# Patient Record
Sex: MALE | Race: WHITE | NOT HISPANIC OR LATINO | Employment: OTHER | URBAN - METROPOLITAN AREA
[De-identification: names, ages, dates, MRNs, and addresses within clinical notes are randomized per-mention and may not be internally consistent; named-entity substitution may affect disease eponyms.]

---

## 2019-08-14 PROBLEM — I25.10 CORONARY ARTERIOSCLEROSIS: Status: ACTIVE | Noted: 2019-08-14

## 2019-08-14 PROBLEM — Z92.29 H/O HIGH RISK MEDICATION TREATMENT: Status: ACTIVE | Noted: 2019-08-14

## 2019-08-14 PROBLEM — E83.42 HYPOMAGNESEMIA: Status: ACTIVE | Noted: 2019-08-14

## 2019-08-14 PROBLEM — E03.9 HYPOTHYROIDISM: Status: ACTIVE | Noted: 2019-08-14

## 2019-08-14 PROBLEM — Z92.89 H/O CARDIOVASCULAR STRESS TEST: Status: ACTIVE | Noted: 2019-08-14

## 2019-08-14 PROBLEM — Z98.890 H/O CARDIAC CATHETERIZATION: Status: ACTIVE | Noted: 2019-08-14

## 2019-08-14 PROBLEM — K76.0 STEATOSIS OF LIVER: Status: ACTIVE | Noted: 2019-08-14

## 2019-08-14 PROBLEM — E11.9 DIABETES MELLITUS (HCC): Status: ACTIVE | Noted: 2019-08-14

## 2019-08-14 PROBLEM — Z95.1 S/P CABG (CORONARY ARTERY BYPASS GRAFT): Status: ACTIVE | Noted: 2019-08-14

## 2019-08-14 PROBLEM — Z92.89 H/O ECHOCARDIOGRAM: Status: ACTIVE | Noted: 2019-08-14

## 2019-08-14 PROBLEM — I10 BENIGN ESSENTIAL HYPERTENSION: Status: ACTIVE | Noted: 2019-08-14

## 2019-08-15 ENCOUNTER — OFFICE VISIT (OUTPATIENT)
Dept: CARDIOLOGY | Facility: CLINIC | Age: 64
End: 2019-08-15

## 2019-08-15 VITALS
WEIGHT: 237 LBS | SYSTOLIC BLOOD PRESSURE: 164 MMHG | OXYGEN SATURATION: 98 % | DIASTOLIC BLOOD PRESSURE: 72 MMHG | HEART RATE: 81 BPM | BODY MASS INDEX: 35.1 KG/M2 | HEIGHT: 69 IN

## 2019-08-15 DIAGNOSIS — I25.10 CORONARY ARTERIOSCLEROSIS: ICD-10-CM

## 2019-08-15 DIAGNOSIS — E66.09 CLASS 2 OBESITY DUE TO EXCESS CALORIES WITHOUT SERIOUS COMORBIDITY WITH BODY MASS INDEX (BMI) OF 35.0 TO 35.9 IN ADULT: ICD-10-CM

## 2019-08-15 DIAGNOSIS — E78.5 DYSLIPIDEMIA: ICD-10-CM

## 2019-08-15 DIAGNOSIS — Z95.1 S/P CABG (CORONARY ARTERY BYPASS GRAFT): ICD-10-CM

## 2019-08-15 DIAGNOSIS — I10 BENIGN ESSENTIAL HYPERTENSION: Primary | ICD-10-CM

## 2019-08-15 DIAGNOSIS — I10 HYPERTENSION WITH HYPERVOLEMIA: ICD-10-CM

## 2019-08-15 DIAGNOSIS — E10.9 TYPE 1 DIABETES MELLITUS WITHOUT COMPLICATION (HCC): ICD-10-CM

## 2019-08-15 DIAGNOSIS — G47.33 OBSTRUCTIVE SLEEP APNEA ON CPAP: ICD-10-CM

## 2019-08-15 DIAGNOSIS — Z99.89 OBSTRUCTIVE SLEEP APNEA ON CPAP: ICD-10-CM

## 2019-08-15 DIAGNOSIS — E87.79 HYPERTENSION WITH HYPERVOLEMIA: ICD-10-CM

## 2019-08-15 DIAGNOSIS — Z94.0 KIDNEY TRANSPLANTED: ICD-10-CM

## 2019-08-15 PROCEDURE — 99214 OFFICE O/P EST MOD 30 MIN: CPT | Performed by: INTERNAL MEDICINE

## 2019-08-15 RX ORDER — UBIDECARENONE 200 MG
200 CAPSULE ORAL DAILY
COMMUNITY

## 2019-08-15 RX ORDER — FLUOROURACIL 5 MG/G
1 CREAM TOPICAL DAILY PRN
COMMUNITY
End: 2022-10-06

## 2019-08-15 RX ORDER — AMOXICILLIN 500 MG
1200 CAPSULE ORAL DAILY
COMMUNITY
End: 2022-03-31 | Stop reason: ALTCHOICE

## 2019-08-15 NOTE — PROGRESS NOTES
[facility]        Subjective:     Encounter Date:08/15/2019      Patient ID: Ken Omalley is a 63 y.o. male.      HPI: I saw Ken Omalley today for cardiovascular care.  He is a pleasant 63-year-old male who is active.  He does not experience chest pain pressure tightness.  He has his baseline dyspnea on exertion but this is without change.  He is free of orthopnea or PND he denies syncope near syncope or any significant palpitations.  He continues to have lower extremity edema.  He admits to not taking his diuretic therapy every day.  His weight has ranged from 231-235 and his blood pressure has ranged between 159//73.  His blood pressure is elevated here today at 164/72.    Mr. Omalley underwent coronary artery bypass surgery in 2004.  He is undergone previous kidney transplantation.  He obtain an echocardiogram 1/30/2018 demonstrated left ventricular ejection fraction 35 to 40%, mild mitral and tricuspid insufficiency was noted.  He has been found to have obstructive sleep apnea and is using CPAP on a routine basis.      The following portions of the patient's history were reviewed and updated as appropriate: allergies, current medications, past family history, past medical history, past social history, past surgical history and problem list.    Problem List:  Patient Active Problem List   Diagnosis   • Surgery follow-up examination   • Dyslipidemia   • Kidney transplanted   • Hypothyroidism   • Diabetes mellitus (CMS/HCC)   • Hypomagnesemia   • Benign essential hypertension   • Coronary arteriosclerosis   • Steatosis of liver   • H/O high risk medication treatment   • H/O echocardiogram   • H/O cardiovascular stress test   • H/O cardiac catheterization   • S/P CABG (coronary artery bypass graft)   • Hypertension with hypervolemia   • Obstructive sleep apnea on CPAP   • Class 2 obesity due to excess calories without serious comorbidity with body mass index (BMI) of 35.0 to 35.9  in adult       Past Medical History:  Past Medical History:   Diagnosis Date   • Benign essential hypertension 8/14/2019   • Chronic kidney disease    • Coronary arteriosclerosis 8/14/2019    H/o CABG   • Depression    • Diabetes mellitus (CMS/HCC)    • Disease of thyroid gland    • GERD (gastroesophageal reflux disease)    • H/O cardiac catheterization 8/14/2019 11/28/2010 - Left main 50%.  LAD mid 100%.  LCX 50%, proximal 50%, small ramus branch mid 60%.  OM1 proximal 40%.  Zachary circumflex prior to PDA 60-70%.  RCA nondominant, mid 80%.  DA to LAD patent.  SVG to brittany small diagonal branch was patent, sequential SVT to ramus and mid marginal totally occluded.   • H/O echocardiogram 8/14/2019 03/27/2014 - EF 55-60%.  Mild concentric LVH.  Moderately dilated LA.  Mild MI and TI.  Trace AI.   • H/O high risk medication treatment 8/14/2019   • Heart attack (CMS/HCC)     2010   • Hypertension    • Hypomagnesemia 8/14/2019   • Hypothyroidism 8/14/2019   • Kidney failure    • Kidney transplant failure     2009   • Low back pain    • S/P CABG (coronary artery bypass graft) 8/14/2019 01/01/2004 - LIMA to the LAD, SVG to the diagonal branch, and sequential SVG to the intermediui artery and obtuse marginal branch of the circumflex.   • Sciatic pain     RIGHT    • Steatosis of liver 8/14/2019   • Wears hearing aid        Past Surgical History:  Past Surgical History:   Procedure Laterality Date   • CHOLECYSTECTOMY     • CORONARY ARTERY BYPASS GRAFT     • DIALYSIS FISTULA CREATION Left    • EPIDURAL BLOCK     • LUMBAR DISCECTOMY FUSION INSTRUMENTATION Right 10/17/2016    Procedure: Right L4 5 laminectomy and discectomy with Metrix;  Surgeon: Segrio Strange MD;  Location: [facility];  Service:    • TRANSPLANTATION RENAL      2006 2011   • VASECTOMY         Social History:  Social History     Socioeconomic History   • Marital status:      Spouse name: Not on file   • Number of children: Not on file   •  "Years of education: Not on file   • Highest education level: Not on file   Tobacco Use   • Smoking status: Never Smoker   • Smokeless tobacco: Never Used   Substance and Sexual Activity   • Alcohol use: No   • Drug use: No   • Sexual activity: Defer       Allergies:  Allergies   Allergen Reactions   • Contrast Dye      KIDNEY TRANSPLANT         ROS:  Review of Systems   Constitution: Negative for weakness and malaise/fatigue.   HENT: Negative for hearing loss and nosebleeds.    Eyes: Negative for double vision and visual disturbance.   Cardiovascular: Positive for dyspnea on exertion and leg swelling. Negative for chest pain, claudication, near-syncope, orthopnea, palpitations, paroxysmal nocturnal dyspnea and syncope.   Respiratory: Negative for cough, shortness of breath, sleep disturbances due to breathing, snoring, sputum production and wheezing.    Endocrine: Negative for cold intolerance, heat intolerance, polydipsia and polyuria.   Hematologic/Lymphatic: Negative for adenopathy and bleeding problem. Does not bruise/bleed easily.   Skin: Negative for flushing and itching.   Musculoskeletal: Negative for back pain, falls, joint pain, joint swelling, muscle weakness and neck pain.   Gastrointestinal: Negative for abdominal pain, dysphagia, heartburn, nausea and vomiting.   Genitourinary: Negative for dysuria and frequency.   Neurological: Negative for disturbances in coordination, dizziness, light-headedness, loss of balance and numbness.   Psychiatric/Behavioral: Negative for altered mental status and memory loss. The patient is not nervous/anxious.    Allergic/Immunologic: Negative for hives and persistent infections.          Objective:         /72 (BP Location: Right arm, Patient Position: Sitting, Cuff Size: Large Adult)   Pulse 81   Ht 175.3 cm (69\")   Wt 108 kg (237 lb)   SpO2 98%   BMI 35.00 kg/m²     Physical Exam   Constitutional: He is oriented to person, place, and time. He appears " well-developed and well-nourished.   HENT:   Head: Normocephalic and atraumatic.   Eyes: Conjunctivae and EOM are normal. Pupils are equal, round, and reactive to light.   Neck: Neck supple. No thyromegaly present.   Cardiovascular: Normal rate, regular rhythm, S1 normal, S2 normal and intact distal pulses. PMI is not displaced. Exam reveals gallop and S4. Exam reveals no S3, no distant heart sounds, no friction rub, no midsystolic click and no opening snap.   No murmur heard.  Pulses:       Carotid pulses are 2+ on the right side, and 2+ on the left side.       Radial pulses are 2+ on the right side, and 2+ on the left side.        Femoral pulses are 2+ on the right side, and 2+ on the left side.       Dorsalis pedis pulses are 2+ on the right side, and 2+ on the left side.        Posterior tibial pulses are 2+ on the right side, and 2+ on the left side.   1/6 systolic murmur left sternal border   Pulmonary/Chest: Effort normal and breath sounds normal. No respiratory distress. He has no wheezes. He has no rales.   Abdominal: Soft. Bowel sounds are normal. He exhibits no distension and no mass. There is no tenderness.   Musculoskeletal: Normal range of motion. He exhibits no edema.   1+ bilateral lower extremity edema   Neurological: He is alert and oriented to person, place, and time.   Skin: Skin is warm and dry. No erythema.   Psychiatric: He has a normal mood and affect.       In-Office Procedure(s):  Procedures    ASCVD RIsk Score::  The 10-year ASCVD risk score ([place] TITUS Jr., et al., 2013) is: 50.6%    Values used to calculate the score:      Age: 63 years      Sex: Male      Is Non- : No      Diabetic: Yes      Tobacco smoker: No      Systolic Blood Pressure: 164 mmHg      Is BP treated: Yes      HDL Cholesterol: 32 mg/dL      Total Cholesterol: 308 mg/dL        Assessment/Plan:         1. Benign essential hypertension  Salt restriction to his close to 2000 mg a day as possible, trial  of Bystolic 5 mg a day, target blood pressure less than 130/80    2. Hypertension with hypervolemia  Salt and fluid restriction, encouraged to take furosemide 40 mg daily    3. Coronary arteriosclerosis  Stable    4. S/P CABG (coronary artery bypass graft)  Stable    5. Type 1 diabetes mellitus without complication (CMS/HCC)  Stable    6. Dyslipidemia  Observe low-cholesterol low saturated weight reduction diet, continue pitavastatin 1 mg daily    7. Class 2 obesity due to excess calories without serious comorbidity with body mass index (BMI) of 35.0 to 35.9 in adult  Weight reduction    8. Obstructive sleep apnea on CPAP  Continue CPAP    9. Kidney transplanted      I had a greater than 30-minute face-to-face discussion with Mr. Omalley, greater than 50% was spent in counseling.  We discussed the importance of blood pressure control.  We discussed restricting salt and fluid intake increasing activity and observing a low-cholesterol low saturated weight reduction diet.  I have asked him to monitor his blood pressure at home with the addition of B 5 mg daily.ystolic we will discuss with his nephrologist today of possible addition of an ACE inhibitor or ARB if needed.  I will plan to see him in follow-up in 4 to 6 weeks.           Edmundo Conner MD  08/15/19  .

## 2019-09-26 ENCOUNTER — OFFICE VISIT (OUTPATIENT)
Dept: CARDIOLOGY | Facility: CLINIC | Age: 64
End: 2019-09-26

## 2019-09-26 VITALS
HEIGHT: 69 IN | DIASTOLIC BLOOD PRESSURE: 76 MMHG | SYSTOLIC BLOOD PRESSURE: 132 MMHG | BODY MASS INDEX: 34.51 KG/M2 | WEIGHT: 233 LBS | HEART RATE: 82 BPM | OXYGEN SATURATION: 98 %

## 2019-09-26 DIAGNOSIS — G47.33 OBSTRUCTIVE SLEEP APNEA ON CPAP: ICD-10-CM

## 2019-09-26 DIAGNOSIS — Z94.0 KIDNEY TRANSPLANTED: ICD-10-CM

## 2019-09-26 DIAGNOSIS — E87.79 HYPERTENSION WITH HYPERVOLEMIA: ICD-10-CM

## 2019-09-26 DIAGNOSIS — I25.10 CORONARY ARTERIOSCLEROSIS: Primary | ICD-10-CM

## 2019-09-26 DIAGNOSIS — E10.9 TYPE 1 DIABETES MELLITUS WITHOUT COMPLICATION (HCC): ICD-10-CM

## 2019-09-26 DIAGNOSIS — I10 HYPERTENSION WITH HYPERVOLEMIA: ICD-10-CM

## 2019-09-26 DIAGNOSIS — Z95.1 S/P CABG (CORONARY ARTERY BYPASS GRAFT): ICD-10-CM

## 2019-09-26 DIAGNOSIS — Z99.89 OBSTRUCTIVE SLEEP APNEA ON CPAP: ICD-10-CM

## 2019-09-26 DIAGNOSIS — E66.09 CLASS 2 OBESITY DUE TO EXCESS CALORIES WITHOUT SERIOUS COMORBIDITY WITH BODY MASS INDEX (BMI) OF 35.0 TO 35.9 IN ADULT: ICD-10-CM

## 2019-09-26 DIAGNOSIS — I10 BENIGN ESSENTIAL HYPERTENSION: ICD-10-CM

## 2019-09-26 DIAGNOSIS — E78.5 DYSLIPIDEMIA: ICD-10-CM

## 2019-09-26 PROCEDURE — 99214 OFFICE O/P EST MOD 30 MIN: CPT | Performed by: INTERNAL MEDICINE

## 2019-09-26 NOTE — PROGRESS NOTES
[facility]        Subjective:     Encounter Date:09/26/2019      Patient ID: Ken Omalley is a 64 y.o. male.      HPI: I saw Ken Omalley today for continued cardiovascular care.  He is a pleasant 64-year-old male who is undergone previous kidney transplantation.  He has a known history of coronary heart disease and underwent coronary artery bypass surgery in 2004.  He denies chest pain pressure tightness.  He did report some dyspnea on exertion and had evidence of lower extremity edema as well as elevated blood pressure.  He has reduced his salt and fluid intake and is observing a low-cholesterol low saturated fat weight reduction diet.  He has had about a 5 to 6 pound weight reduction.  And his blood pressure is improved.  His lower extremity edema persists but has decreased.  Overall he feels well and has maintained his activity level.  He does not report orthopnea or PND no syncope near syncope or significant palpitations.  He is taking his furosemide 40 mg daily.      The following portions of the patient's history were reviewed and updated as appropriate: allergies, current medications, past family history, past medical history, past social history, past surgical history and problem list.    Problem List:  Patient Active Problem List   Diagnosis   • Surgery follow-up examination   • Dyslipidemia   • Kidney transplanted   • Hypothyroidism   • Diabetes mellitus (CMS/HCC)   • Hypomagnesemia   • Benign essential hypertension   • Coronary arteriosclerosis   • Steatosis of liver   • H/O high risk medication treatment   • H/O echocardiogram   • H/O cardiovascular stress test   • H/O cardiac catheterization   • S/P CABG (coronary artery bypass graft)   • Hypertension with hypervolemia   • Obstructive sleep apnea on CPAP   • Class 2 obesity due to excess calories without serious comorbidity with body mass index (BMI) of 35.0 to 35.9 in adult       Past Medical History:  Past Medical History:    Diagnosis Date   • Benign essential hypertension 8/14/2019   • Chronic kidney disease    • Coronary arteriosclerosis 8/14/2019    H/o CABG   • Depression    • Diabetes mellitus (CMS/HCC)    • Disease of thyroid gland    • GERD (gastroesophageal reflux disease)    • H/O cardiac catheterization 8/14/2019 11/28/2010 - Left main 50%.  LAD mid 100%.  LCX 50%, proximal 50%, small ramus branch mid 60%.  OM1 proximal 40%.  Zachary circumflex prior to PDA 60-70%.  RCA nondominant, mid 80%.  DA to LAD patent.  SVG to brittany small diagonal branch was patent, sequential SVT to ramus and mid marginal totally occluded.   • H/O echocardiogram 8/14/2019 03/27/2014 - EF 55-60%.  Mild concentric LVH.  Moderately dilated LA.  Mild MI and TI.  Trace AI.   • H/O high risk medication treatment 8/14/2019   • Heart attack (CMS/HCC)     2010   • Hypertension    • Hypomagnesemia 8/14/2019   • Hypothyroidism 8/14/2019   • Kidney failure    • Kidney transplant failure     2009   • Low back pain    • S/P CABG (coronary artery bypass graft) 8/14/2019 01/01/2004 - LIMA to the LAD, SVG to the diagonal branch, and sequential SVG to the intermediui artery and obtuse marginal branch of the circumflex.   • Sciatic pain     RIGHT    • Steatosis of liver 8/14/2019   • Wears hearing aid        Past Surgical History:  Past Surgical History:   Procedure Laterality Date   • CHOLECYSTECTOMY     • CORONARY ARTERY BYPASS GRAFT     • DIALYSIS FISTULA CREATION Left    • EPIDURAL BLOCK     • LUMBAR DISCECTOMY FUSION INSTRUMENTATION Right 10/17/2016    Procedure: Right L4 5 laminectomy and discectomy with Metrix;  Surgeon: Sergio Strange MD;  Location: [facility];  Service:    • TRANSPLANTATION RENAL      2006 2011   • VASECTOMY         Social History:  Social History     Socioeconomic History   • Marital status:      Spouse name: Not on file   • Number of children: Not on file   • Years of education: Not on file   • Highest education level:  "Not on file   Tobacco Use   • Smoking status: Never Smoker   • Smokeless tobacco: Never Used   Substance and Sexual Activity   • Alcohol use: No   • Drug use: No   • Sexual activity: Defer       Allergies:  Allergies   Allergen Reactions   • Contrast Dye      KIDNEY TRANSPLANT         ROS:  Review of Systems   Constitution: Negative for weakness and malaise/fatigue.   HENT: Negative for hearing loss and nosebleeds.    Eyes: Negative for double vision and visual disturbance.   Cardiovascular: Positive for dyspnea on exertion and leg swelling. Negative for chest pain, claudication, near-syncope, orthopnea, palpitations, paroxysmal nocturnal dyspnea and syncope.   Respiratory: Negative for cough, shortness of breath, sleep disturbances due to breathing, snoring, sputum production and wheezing.    Endocrine: Negative for cold intolerance, heat intolerance, polydipsia and polyuria.   Hematologic/Lymphatic: Negative for adenopathy and bleeding problem. Does not bruise/bleed easily.   Skin: Negative for flushing and itching.   Musculoskeletal: Negative for back pain, falls, joint pain, joint swelling, muscle weakness and neck pain.   Gastrointestinal: Negative for abdominal pain, dysphagia, heartburn, nausea and vomiting.   Genitourinary: Negative for dysuria and frequency.   Neurological: Negative for disturbances in coordination, dizziness, light-headedness, loss of balance and numbness.   Psychiatric/Behavioral: Negative for altered mental status and memory loss. The patient is not nervous/anxious.    Allergic/Immunologic: Negative for hives and persistent infections.          Objective:         /76 (BP Location: Right arm, Patient Position: Sitting, Cuff Size: Large Adult)   Pulse 82   Ht 175.3 cm (69\")   Wt 106 kg (233 lb)   SpO2 98%   BMI 34.41 kg/m²     Physical Exam   Constitutional: He is oriented to person, place, and time. He appears well-developed and well-nourished.   HENT:   Head: Normocephalic and " atraumatic.   Eyes: Conjunctivae and EOM are normal. Pupils are equal, round, and reactive to light.   Neck: Neck supple. No thyromegaly present.   Cardiovascular: Normal rate, regular rhythm, S1 normal, S2 normal and intact distal pulses. PMI is not displaced. Exam reveals gallop and S4. Exam reveals no S3, no distant heart sounds, no friction rub, no midsystolic click and no opening snap.   No murmur heard.  Pulses:       Carotid pulses are 2+ on the right side, and 2+ on the left side.       Radial pulses are 2+ on the right side, and 2+ on the left side.        Femoral pulses are 2+ on the right side, and 2+ on the left side.       Dorsalis pedis pulses are 2+ on the right side, and 2+ on the left side.        Posterior tibial pulses are 2+ on the right side, and 2+ on the left side.   1/6 systolic murmur left sternal border   Pulmonary/Chest: Effort normal and breath sounds normal. No respiratory distress. He has no wheezes. He has no rales.   Abdominal: Soft. Bowel sounds are normal. He exhibits no distension and no mass. There is no tenderness.   Musculoskeletal: Normal range of motion. He exhibits no edema.   Trace bilateral lower extremity edema   Neurological: He is alert and oriented to person, place, and time.   Skin: Skin is warm and dry. No erythema.   Psychiatric: He has a normal mood and affect.       In-Office Procedure(s):  Procedures    ASCVD RIsk Score::  The 10-year ASCVD risk score ([place] TITUS Jr., et al., 2013) is: 39.6%    Values used to calculate the score:      Age: 64 years      Sex: Male      Is Non- : No      Diabetic: Yes      Tobacco smoker: No      Systolic Blood Pressure: 132 mmHg      Is BP treated: Yes      HDL Cholesterol: 32 mg/dL      Total Cholesterol: 308 mg/dL        Assessment/Plan:         1. Coronary arteriosclerosis  Stable    2. S/P CABG (coronary artery bypass graft)  Stable    3. Benign essential hypertension  Controlled    4. Hypertension with  hypervolemia  Improved    5. Dyslipidemia  Continue low-cholesterol low saturated fat salt restricted diet    6. Type 1 diabetes mellitus without complication (CMS/HCC)      7. Obstructive sleep apnea on CPAP  Stable    8. Class 2 obesity due to excess calories without serious comorbidity with body mass index (BMI) of 35.0 to 35.9 in adult  Gradually improving    9. Kidney transplanted  Stable    I feel Ken is stable and improved from the cardiovascular standpoint.  I made no changes in his medications at present time.  I have encouraged him to continue to restrict his salt and fluid in his diet and observe a low-cholesterol low saturated fat weight reduction diet.  I will see him in follow-up in 6 months sooner if needed.               Edmundo Conner MD  09/26/19  .

## 2019-10-01 DIAGNOSIS — I10 BENIGN ESSENTIAL HYPERTENSION: Primary | ICD-10-CM

## 2019-10-01 RX ORDER — HYDRALAZINE HYDROCHLORIDE 100 MG/1
100 TABLET, FILM COATED ORAL 3 TIMES DAILY
Qty: 270 TABLET | Refills: 1 | Status: SHIPPED | OUTPATIENT
Start: 2019-10-01 | End: 2020-03-26

## 2020-03-26 DIAGNOSIS — I10 BENIGN ESSENTIAL HYPERTENSION: ICD-10-CM

## 2020-03-26 RX ORDER — HYDRALAZINE HYDROCHLORIDE 100 MG/1
100 TABLET, FILM COATED ORAL 3 TIMES DAILY
Qty: 270 TABLET | Refills: 1 | Status: SHIPPED | OUTPATIENT
Start: 2020-03-26 | End: 2020-10-22 | Stop reason: SDUPTHER

## 2020-04-14 ENCOUNTER — TELEMEDICINE (OUTPATIENT)
Dept: CARDIOLOGY | Facility: CLINIC | Age: 65
End: 2020-04-14

## 2020-04-14 VITALS — HEART RATE: 70 BPM | DIASTOLIC BLOOD PRESSURE: 66 MMHG | SYSTOLIC BLOOD PRESSURE: 131 MMHG

## 2020-04-14 DIAGNOSIS — Z99.89 OBSTRUCTIVE SLEEP APNEA ON CPAP: ICD-10-CM

## 2020-04-14 DIAGNOSIS — I25.10 CORONARY ARTERIOSCLEROSIS: Primary | ICD-10-CM

## 2020-04-14 DIAGNOSIS — E11.59 TYPE 2 DIABETES MELLITUS WITH OTHER CIRCULATORY COMPLICATION, WITH LONG-TERM CURRENT USE OF INSULIN (HCC): ICD-10-CM

## 2020-04-14 DIAGNOSIS — E66.09 CLASS 2 OBESITY DUE TO EXCESS CALORIES WITHOUT SERIOUS COMORBIDITY WITH BODY MASS INDEX (BMI) OF 35.0 TO 35.9 IN ADULT: ICD-10-CM

## 2020-04-14 DIAGNOSIS — Z95.1 S/P CABG (CORONARY ARTERY BYPASS GRAFT): ICD-10-CM

## 2020-04-14 DIAGNOSIS — Z94.0 KIDNEY TRANSPLANTED: ICD-10-CM

## 2020-04-14 DIAGNOSIS — G47.33 OBSTRUCTIVE SLEEP APNEA ON CPAP: ICD-10-CM

## 2020-04-14 DIAGNOSIS — I10 BENIGN ESSENTIAL HYPERTENSION: ICD-10-CM

## 2020-04-14 DIAGNOSIS — E78.5 DYSLIPIDEMIA: ICD-10-CM

## 2020-04-14 DIAGNOSIS — Z79.4 TYPE 2 DIABETES MELLITUS WITH OTHER CIRCULATORY COMPLICATION, WITH LONG-TERM CURRENT USE OF INSULIN (HCC): ICD-10-CM

## 2020-04-14 PROCEDURE — 99214 OFFICE O/P EST MOD 30 MIN: CPT | Performed by: INTERNAL MEDICINE

## 2020-04-14 NOTE — PROGRESS NOTES
[facility] HEART SPECIALISTS    You have chosen to receive care through a telehealth visit.  Do you consent to use a video/audio connection for your medical care today? Yes    Subjective:     Encounter Date:04/14/2020      Patient ID: Ken Omalley is a 64 y.o. male.      HPI: Ken Omalley agreed to video visit secondary to the coronavirus pandemic.  Mr. Omalley is a pleasant 64-year-old male with known coronary heart disease.  He is undergone previous coronary artery bypass surgery in 2004 with a left ALIVIA to the LAD, saphenous vein graft to diagonal branch, sequential saphenous vein graft to ramus and obtuse marginal branch.  He underwent kidney transplant  in 2009 he remains free of fever cough or shortness of breath.  He does not report any change in his sense of smell and taste.  He has been following CDC guidelines for social distancing during the coronavirus pandemic.  He denies chest pain pressure tightness.  He is maintained a good activity level mowing his yard and playing golf occasionally.  He does not report orthopnea or PND.  He has had prior lower extremity edema which is improved but still occasionally present.  He denies syncope near syncope or palpitations.  He has obstructive sleep apnea and uses CPAP routinely.  He has a history of dyslipidemia and remains on Zetia.  He has type 2 diabetes.  He reports recent hypokalemia and has been placed on potassium supplementation.      The following portions of the patient's history were reviewed and updated as appropriate: allergies, current medications, past family history, past medical history, past social history, past surgical history and problem list.    Problem List:  Patient Active Problem List   Diagnosis   • Surgery follow-up examination   • Dyslipidemia   • Kidney transplanted   • Hypothyroidism   • Type 2 diabetes mellitus with circulatory disorder, with long-term current use of insulin (CMS/[facility])   • Hypomagnesemia   • Benign essential  hypertension   • Coronary arteriosclerosis   • Steatosis of liver   • H/O high risk medication treatment   • H/O echocardiogram   • H/O cardiovascular stress test   • H/O cardiac catheterization   • S/P CABG (coronary artery bypass graft)   • Hypertension with hypervolemia   • Obstructive sleep apnea on CPAP   • Class 2 obesity due to excess calories without serious comorbidity with body mass index (BMI) of 35.0 to 35.9 in adult       Past Medical History:  Past Medical History:   Diagnosis Date   • Benign essential hypertension 8/14/2019   • Chronic kidney disease    • Coronary arteriosclerosis 8/14/2019    H/o CABG   • Depression    • Diabetes mellitus (CMS/HCC)    • Disease of thyroid gland    • GERD (gastroesophageal reflux disease)    • H/O cardiac catheterization 8/14/2019 11/28/2010 - Left main 50%.  LAD mid 100%.  LCX 50%, proximal 50%, small ramus branch mid 60%.  OM1 proximal 40%.  Zachary circumflex prior to PDA 60-70%.  RCA nondominant, mid 80%.  DA to LAD patent.  SVG to brittany small diagonal branch was patent, sequential SVT to ramus and mid marginal totally occluded.   • H/O echocardiogram 8/14/2019 03/27/2014 - EF 55-60%.  Mild concentric LVH.  Moderately dilated LA.  Mild MI and TI.  Trace AI.   • H/O high risk medication treatment 8/14/2019   • Heart attack (CMS/HCC)     2010   • Hypertension    • Hypomagnesemia 8/14/2019   • Hypothyroidism 8/14/2019   • Kidney failure    • Kidney transplant failure     2009   • Low back pain    • S/P CABG (coronary artery bypass graft) 8/14/2019 01/01/2004 - LIMA to the LAD, SVG to the diagonal branch, and sequential SVG to the intermediui artery and obtuse marginal branch of the circumflex.   • Sciatic pain     RIGHT    • Steatosis of liver 8/14/2019   • Wears hearing aid        Past Surgical History:  Past Surgical History:   Procedure Laterality Date   • CHOLECYSTECTOMY     • CORONARY ARTERY BYPASS GRAFT     • DIALYSIS FISTULA CREATION Left    • EPIDURAL  BLOCK     • LUMBAR DISCECTOMY FUSION INSTRUMENTATION Right 10/17/2016    Procedure: Right L4 5 laminectomy and discectomy with Metrix;  Surgeon: Sergio Strange MD;  Location: [facility];  Service:    • TRANSPLANTATION RENAL      2006 2011   • VASECTOMY         Social History:  Social History     Socioeconomic History   • Marital status:      Spouse name: Not on file   • Number of children: Not on file   • Years of education: Not on file   • Highest education level: Not on file   Tobacco Use   • Smoking status: Never Smoker   • Smokeless tobacco: Never Used   Substance and Sexual Activity   • Alcohol use: No   • Drug use: No   • Sexual activity: Defer       Allergies:  Allergies   Allergen Reactions   • Contrast Dye      KIDNEY TRANSPLANT         ROS:  Review of Systems   Constitution: Negative for malaise/fatigue.   HENT: Negative for hearing loss and nosebleeds.    Eyes: Negative for double vision and visual disturbance.   Cardiovascular: Positive for dyspnea on exertion and leg swelling. Negative for chest pain, claudication, near-syncope, orthopnea, palpitations, paroxysmal nocturnal dyspnea and syncope.   Respiratory: Negative for cough, shortness of breath, sleep disturbances due to breathing, snoring, sputum production and wheezing.    Endocrine: Negative for cold intolerance, heat intolerance, polydipsia and polyuria.   Hematologic/Lymphatic: Negative for adenopathy and bleeding problem. Does not bruise/bleed easily.   Skin: Negative for flushing and itching.   Musculoskeletal: Negative for back pain, falls, joint pain, joint swelling, muscle weakness and neck pain.   Gastrointestinal: Negative for abdominal pain, dysphagia, heartburn, nausea and vomiting.   Genitourinary: Negative for dysuria and frequency.   Neurological: Negative for disturbances in coordination, dizziness, light-headedness, loss of balance, numbness and weakness.   Psychiatric/Behavioral: Negative for altered mental status  and memory loss. The patient is not nervous/anxious.    Allergic/Immunologic: Negative for hives and persistent infections.          Objective:         /66   Pulse 70     Physical Exam not performed    In-Office Procedure(s):  Procedures    ASCVD RIsk Score::  The ASCVD Risk score (Dionne QURESHI Jr., et al., 2013) failed to calculate for the following reasons:    The valid total cholesterol range is 130 to 320 mg/dL        Assessment/Plan:         1. Coronary arteriosclerosis  Stable free of angina    2. S/P CABG (coronary artery bypass graft)  Stable    3. Kidney transplanted  Stable    4. Benign essential hypertension  Controlled, salt restriction is close to 2000 mg a day as possible    5. Class 2 obesity due to excess calories without serious comorbidity with body mass index (BMI) of 35.0 to 35.9 in adult  Encourage weight reduction    6. Dyslipidemia  Observe low-cholesterol low saturated fat diet    7. Type 2 diabetes mellitus with other circulatory complication, with long-term current use of insulin (CMS/[facility])  Weight reduction    8. Obstructive sleep apnea on CPAP  Continue CPAP and encourage weight reduction    Mr. Omalley remains active and free of angina.  His blood pressures controlled his lipids and diabetes are monitored.  I have counseled him on the importance of salt restriction is close to 2000 mg a day as possible, observing a low-cholesterol low saturated fat diet with target LDL of 70 or less and triglycerides less than 150.  Of encouraged him to continue his activity level.       Edmundo Conner MD  04/14/20  .

## 2020-05-11 RX ORDER — FUROSEMIDE 40 MG/1
TABLET ORAL
Qty: 90 TABLET | Refills: 1 | Status: SHIPPED | OUTPATIENT
Start: 2020-05-11 | End: 2020-10-28

## 2020-05-18 ENCOUNTER — TELEPHONE (OUTPATIENT)
Dept: CARDIOLOGY | Facility: CLINIC | Age: 65
End: 2020-05-18

## 2020-05-18 DIAGNOSIS — I10 BENIGN ESSENTIAL HYPERTENSION: Primary | ICD-10-CM

## 2020-05-18 RX ORDER — CLONIDINE HYDROCHLORIDE 0.1 MG/1
0.1 TABLET ORAL 2 TIMES DAILY
Qty: 60 TABLET | Refills: 5 | Status: SHIPPED | OUTPATIENT
Start: 2020-05-18 | End: 2020-07-23

## 2020-05-18 NOTE — TELEPHONE ENCOUNTER
----- Message from Arian Omalley sent at 5/18/2020  9:56 AM EDT -----  Regarding: Non-Urgent Medical Question  Contact: [phone]  Morning Dr. Conner,    My blood pressure has been running higher than usual the last 5 days. It normaly been in the upper 120's to mid 130's over mid 70's.  It was 165/86 this morning. No swelling other than normal ankle. No chest pain. Some right shoulder blade pain, not bad. No shortness of breathe. I have been active as usual, golfing and yard work. Currently taking hydralazine 100 mg x 3 and metoprol 100 mg x 2 and 81 asprin daily. Any idea's? thanks, arian

## 2020-05-18 NOTE — TELEPHONE ENCOUNTER
Mary Anne Spaulding start clonidine 0.1 mg twice daily, he needs a monitor his blood pressure daily and set him up for video visit in 1 week.  Thank

## 2020-05-29 ENCOUNTER — TELEMEDICINE (OUTPATIENT)
Dept: CARDIOLOGY | Facility: CLINIC | Age: 65
End: 2020-05-29

## 2020-05-29 VITALS
HEIGHT: 69 IN | SYSTOLIC BLOOD PRESSURE: 157 MMHG | DIASTOLIC BLOOD PRESSURE: 80 MMHG | HEART RATE: 66 BPM | WEIGHT: 233 LBS | BODY MASS INDEX: 34.51 KG/M2

## 2020-05-29 DIAGNOSIS — Z95.1 S/P CABG (CORONARY ARTERY BYPASS GRAFT): ICD-10-CM

## 2020-05-29 DIAGNOSIS — I10 BENIGN ESSENTIAL HYPERTENSION: Primary | ICD-10-CM

## 2020-05-29 DIAGNOSIS — Z79.4 TYPE 2 DIABETES MELLITUS WITH OTHER CIRCULATORY COMPLICATION, WITH LONG-TERM CURRENT USE OF INSULIN (HCC): ICD-10-CM

## 2020-05-29 DIAGNOSIS — E11.59 TYPE 2 DIABETES MELLITUS WITH OTHER CIRCULATORY COMPLICATION, WITH LONG-TERM CURRENT USE OF INSULIN (HCC): ICD-10-CM

## 2020-05-29 DIAGNOSIS — I25.10 CORONARY ARTERIOSCLEROSIS: ICD-10-CM

## 2020-05-29 DIAGNOSIS — Z94.0 KIDNEY TRANSPLANTED: ICD-10-CM

## 2020-05-29 DIAGNOSIS — E66.09 CLASS 2 OBESITY DUE TO EXCESS CALORIES WITHOUT SERIOUS COMORBIDITY WITH BODY MASS INDEX (BMI) OF 35.0 TO 35.9 IN ADULT: ICD-10-CM

## 2020-05-29 DIAGNOSIS — E78.5 DYSLIPIDEMIA: ICD-10-CM

## 2020-05-29 PROCEDURE — 99214 OFFICE O/P EST MOD 30 MIN: CPT | Performed by: INTERNAL MEDICINE

## 2020-05-29 RX ORDER — AMLODIPINE BESYLATE 5 MG/1
5 TABLET ORAL DAILY
Qty: 30 TABLET | Refills: 5 | Status: SHIPPED | OUTPATIENT
Start: 2020-05-29 | End: 2020-06-09 | Stop reason: SDUPTHER

## 2020-05-29 RX ORDER — POTASSIUM CHLORIDE 20 MEQ/1
20 TABLET, EXTENDED RELEASE ORAL DAILY
COMMUNITY
Start: 2020-05-07 | End: 2021-05-06 | Stop reason: ALTCHOICE

## 2020-05-29 NOTE — PROGRESS NOTES
[facility] HEART SPECIALISTS  You have chosen to receive care through a telehealth visit.  Do you consent to use a video/audio connection for your medical care today? Yes        Subjective:     Encounter Date:05/29/2020      Patient ID: Ken Omalley is a 64 y.o. male.      HPI: Ken Omalley agreed to a video visit today secondary to the coronavirus pandemic.  He remains free of fever cough or increased shortness of breath.  He does not report any change in his sense of smell or taste.    He is undergone previous kidney transplantation.  He does not report chest pain pressure or tightness or progressive dyspnea on exertion.  He states he has minimal lower extremity edema.  His blood pressure is been difficult to control and we added clonidine 0.1 mg twice daily.  He does not tolerate clonidine secondary to dry mouth and tingling in his toes and fingers.  His blood pressure remains elevated today at 157/80.      The following portions of the patient's history were reviewed and updated as appropriate: allergies, current medications, past family history, past medical history, past social history, past surgical history and problem list.    Problem List:  Patient Active Problem List   Diagnosis   • Surgery follow-up examination   • Dyslipidemia   • Kidney transplanted   • Hypothyroidism   • Type 2 diabetes mellitus with circulatory disorder, with long-term current use of insulin (CMS/[facility])   • Hypomagnesemia   • Benign essential hypertension   • Coronary arteriosclerosis   • Steatosis of liver   • H/O high risk medication treatment   • H/O echocardiogram   • H/O cardiovascular stress test   • H/O cardiac catheterization   • S/P CABG (coronary artery bypass graft)   • Hypertension with hypervolemia   • Obstructive sleep apnea on CPAP   • Class 2 obesity due to excess calories without serious comorbidity with body mass index (BMI) of 35.0 to 35.9 in adult       Past Medical History:  Past Medical History:   Diagnosis  Date   • Benign essential hypertension 8/14/2019   • Chronic kidney disease    • Coronary arteriosclerosis 8/14/2019    H/o CABG   • Depression    • Diabetes mellitus (CMS/HCC)    • Disease of thyroid gland    • GERD (gastroesophageal reflux disease)    • H/O cardiac catheterization 8/14/2019 11/28/2010 - Left main 50%.  LAD mid 100%.  LCX 50%, proximal 50%, small ramus branch mid 60%.  OM1 proximal 40%.  Zachary circumflex prior to PDA 60-70%.  RCA nondominant, mid 80%.  DA to LAD patent.  SVG to brittany small diagonal branch was patent, sequential SVT to ramus and mid marginal totally occluded.   • H/O echocardiogram 8/14/2019 03/27/2014 - EF 55-60%.  Mild concentric LVH.  Moderately dilated LA.  Mild MI and TI.  Trace AI.   • H/O high risk medication treatment 8/14/2019   • Heart attack (CMS/HCC)     2010   • Hypertension    • Hypomagnesemia 8/14/2019   • Hypothyroidism 8/14/2019   • Kidney failure    • Kidney transplant failure     2009   • Low back pain    • S/P CABG (coronary artery bypass graft) 8/14/2019 01/01/2004 - LIMA to the LAD, SVG to the diagonal branch, and sequential SVG to the intermediui artery and obtuse marginal branch of the circumflex.   • Sciatic pain     RIGHT    • Steatosis of liver 8/14/2019   • Wears hearing aid        Past Surgical History:  Past Surgical History:   Procedure Laterality Date   • CHOLECYSTECTOMY     • CORONARY ARTERY BYPASS GRAFT     • DIALYSIS FISTULA CREATION Left    • EPIDURAL BLOCK     • LUMBAR DISCECTOMY FUSION INSTRUMENTATION Right 10/17/2016    Procedure: Right L4 5 laminectomy and discectomy with Metrix;  Surgeon: Sergio Srtange MD;  Location: [facility];  Service:    • TRANSPLANTATION RENAL      2006 2011   • VASECTOMY         Social History:  Social History     Socioeconomic History   • Marital status:      Spouse name: Not on file   • Number of children: Not on file   • Years of education: Not on file   • Highest education level: Not on file  "  Tobacco Use   • Smoking status: Never Smoker   • Smokeless tobacco: Never Used   Substance and Sexual Activity   • Alcohol use: No   • Drug use: No   • Sexual activity: Defer       Allergies:  Allergies   Allergen Reactions   • Contrast Dye      KIDNEY TRANSPLANT         ROS:  Review of Systems   Constitution: Negative for malaise/fatigue.   HENT: Negative for hearing loss and nosebleeds.    Eyes: Negative for double vision and visual disturbance.   Cardiovascular: Positive for dyspnea on exertion. Negative for chest pain, claudication, near-syncope, orthopnea, palpitations, paroxysmal nocturnal dyspnea and syncope.   Respiratory: Negative for cough, shortness of breath, sleep disturbances due to breathing, snoring, sputum production and wheezing.    Endocrine: Negative for cold intolerance, heat intolerance, polydipsia and polyuria.   Hematologic/Lymphatic: Negative for adenopathy and bleeding problem. Does not bruise/bleed easily.   Skin: Negative for flushing and itching.   Musculoskeletal: Negative for back pain, falls, joint pain, joint swelling, muscle weakness and neck pain.   Gastrointestinal: Negative for abdominal pain, dysphagia, heartburn, nausea and vomiting.   Genitourinary: Negative for dysuria and frequency.   Neurological: Negative for disturbances in coordination, dizziness, light-headedness, loss of balance, numbness and weakness.   Psychiatric/Behavioral: Negative for altered mental status and memory loss. The patient is not nervous/anxious.    Allergic/Immunologic: Negative for hives and persistent infections.          Objective:         /80   Pulse 66   Ht 175.3 cm (69\")   Wt 106 kg (233 lb)   BMI 34.41 kg/m²     Physical Exam not performed    In-Office Procedure(s):  Procedures    ASCVD RIsk Score::  The ASCVD Risk score (Dionne TITUS Montoya., et al., 2013) failed to calculate for the following reasons:    The valid total cholesterol range is 130 to 320 mg/dL        Assessment/Plan:     "     1. Benign essential hypertension  Uncontrolled    2. Coronary arteriosclerosis  Free of angina    3. S/P CABG (coronary artery bypass graft)  Stable    4. Type 2 diabetes mellitus with other circulatory complication, with long-term current use of insulin (CMS/[facility])  Weight reduction, diet, oral agent and insulin    5. Class 2 obesity due to excess calories without serious comorbidity with body mass index (BMI) of 35.0 to 35.9 in adult  Weight reduction    6. Dyslipidemia  Low-cholesterol low saturated fat diet and Zetia    7. Kidney transplanted  Stable    I had a 20-minute video visit with Mr. Omalley today discussing his hypertension and counseling him on further treatment.  I spent 10 minutes completing electronic medical record documentation I have recommended he restrict his diet for weight reduction and salt restriction is close to 2000 mg a day as possible.  He did not tolerate clonidine.  We will have to retry amlodipine starting at 5 mg daily.  He will monitor his blood pressure and I will reassess in 2 weeks.       Edmundo Conner MD  05/29/20  .

## 2020-06-09 ENCOUNTER — TELEMEDICINE (OUTPATIENT)
Dept: CARDIOLOGY | Facility: CLINIC | Age: 65
End: 2020-06-09

## 2020-06-09 VITALS
HEART RATE: 71 BPM | DIASTOLIC BLOOD PRESSURE: 81 MMHG | WEIGHT: 226 LBS | BODY MASS INDEX: 33.47 KG/M2 | SYSTOLIC BLOOD PRESSURE: 157 MMHG | HEIGHT: 69 IN

## 2020-06-09 DIAGNOSIS — I10 BENIGN ESSENTIAL HYPERTENSION: Primary | ICD-10-CM

## 2020-06-09 DIAGNOSIS — Z79.4 TYPE 2 DIABETES MELLITUS WITH OTHER CIRCULATORY COMPLICATION, WITH LONG-TERM CURRENT USE OF INSULIN (HCC): ICD-10-CM

## 2020-06-09 DIAGNOSIS — Z95.1 S/P CABG (CORONARY ARTERY BYPASS GRAFT): ICD-10-CM

## 2020-06-09 DIAGNOSIS — E11.59 TYPE 2 DIABETES MELLITUS WITH OTHER CIRCULATORY COMPLICATION, WITH LONG-TERM CURRENT USE OF INSULIN (HCC): ICD-10-CM

## 2020-06-09 DIAGNOSIS — Z94.0 KIDNEY TRANSPLANTED: ICD-10-CM

## 2020-06-09 DIAGNOSIS — G47.33 OBSTRUCTIVE SLEEP APNEA ON CPAP: ICD-10-CM

## 2020-06-09 DIAGNOSIS — E66.09 CLASS 2 OBESITY DUE TO EXCESS CALORIES WITHOUT SERIOUS COMORBIDITY WITH BODY MASS INDEX (BMI) OF 35.0 TO 35.9 IN ADULT: ICD-10-CM

## 2020-06-09 DIAGNOSIS — Z99.89 OBSTRUCTIVE SLEEP APNEA ON CPAP: ICD-10-CM

## 2020-06-09 DIAGNOSIS — I25.10 CORONARY ARTERIOSCLEROSIS: ICD-10-CM

## 2020-06-09 DIAGNOSIS — E78.5 DYSLIPIDEMIA: ICD-10-CM

## 2020-06-09 PROCEDURE — 99213 OFFICE O/P EST LOW 20 MIN: CPT | Performed by: INTERNAL MEDICINE

## 2020-06-09 RX ORDER — AMLODIPINE BESYLATE 10 MG/1
10 TABLET ORAL DAILY
Qty: 30 TABLET | Refills: 11 | Status: SHIPPED | OUTPATIENT
Start: 2020-06-09 | End: 2021-05-17

## 2020-06-09 NOTE — PROGRESS NOTES
[facility] HEART SPECIALISTS    You have chosen to receive care through a telehealth visit.  Do you consent to use a video/audio connection for your medical care today? Yes    Subjective:     Encounter Date:06/09/2020      Patient ID: Ken Omalley is a 64 y.o. male.      HPI: Ken Omalley agreed to a video visit today secondary to coronavirus pandemic.  He remains free of fever cough or increased shortness of breath.  He does not experience any change in his sense of smell or taste.  Mr. Omalley requires further blood pressure control.  We initiated clonidine but he did not tolerate the medication due to the generalized numbness and tingling.  We switched to amlodipine at 5 mg/day.  His side effects from clonidine have resolved.  His blood pressure is better controlled but not at target.  He is tolerating amlodipine without significant side effects at present.  He is free of chest pain pressure or tightness.  No increased dyspnea on exertion.  He denies syncope near syncope or palpitations.      The following portions of the patient's history were reviewed and updated as appropriate: allergies, current medications, past family history, past medical history, past social history, past surgical history and problem list.    Problem List:  Patient Active Problem List   Diagnosis   • Surgery follow-up examination   • Dyslipidemia   • Kidney transplanted   • Hypothyroidism   • Type 2 diabetes mellitus with circulatory disorder, with long-term current use of insulin (CMS/[facility])   • Hypomagnesemia   • Benign essential hypertension   • Coronary arteriosclerosis   • Steatosis of liver   • H/O high risk medication treatment   • H/O echocardiogram   • H/O cardiovascular stress test   • H/O cardiac catheterization   • S/P CABG (coronary artery bypass graft)   • Hypertension with hypervolemia   • Obstructive sleep apnea on CPAP   • Class 2 obesity due to excess calories without serious comorbidity with body mass index (BMI)  of 35.0 to 35.9 in adult       Past Medical History:  Past Medical History:   Diagnosis Date   • Benign essential hypertension 8/14/2019   • Chronic kidney disease    • Coronary arteriosclerosis 8/14/2019    H/o CABG   • Depression    • Diabetes mellitus (CMS/HCC)    • Disease of thyroid gland    • GERD (gastroesophageal reflux disease)    • H/O cardiac catheterization 8/14/2019 11/28/2010 - Left main 50%.  LAD mid 100%.  LCX 50%, proximal 50%, small ramus branch mid 60%.  OM1 proximal 40%.  Zachary circumflex prior to PDA 60-70%.  RCA nondominant, mid 80%.  DA to LAD patent.  SVG to brittany small diagonal branch was patent, sequential SVT to ramus and mid marginal totally occluded.   • H/O echocardiogram 8/14/2019 03/27/2014 - EF 55-60%.  Mild concentric LVH.  Moderately dilated LA.  Mild MI and TI.  Trace AI.   • H/O high risk medication treatment 8/14/2019   • Heart attack (CMS/HCC)     2010   • Hypertension    • Hypomagnesemia 8/14/2019   • Hypothyroidism 8/14/2019   • Kidney failure    • Kidney transplant failure     2009   • Low back pain    • S/P CABG (coronary artery bypass graft) 8/14/2019 01/01/2004 - LIMA to the LAD, SVG to the diagonal branch, and sequential SVG to the intermediui artery and obtuse marginal branch of the circumflex.   • Sciatic pain     RIGHT    • Steatosis of liver 8/14/2019   • Wears hearing aid        Past Surgical History:  Past Surgical History:   Procedure Laterality Date   • CHOLECYSTECTOMY     • CORONARY ARTERY BYPASS GRAFT     • DIALYSIS FISTULA CREATION Left    • EPIDURAL BLOCK     • LUMBAR DISCECTOMY FUSION INSTRUMENTATION Right 10/17/2016    Procedure: Right L4 5 laminectomy and discectomy with Metrix;  Surgeon: Sergio Strange MD;  Location: [facility];  Service:    • TRANSPLANTATION RENAL      2006 2011   • VASECTOMY         Social History:  Social History     Socioeconomic History   • Marital status:      Spouse name: Not on file   • Number of children: Not  "on file   • Years of education: Not on file   • Highest education level: Not on file   Tobacco Use   • Smoking status: Never Smoker   • Smokeless tobacco: Never Used   Substance and Sexual Activity   • Alcohol use: No   • Drug use: No   • Sexual activity: Defer       Allergies:  Allergies   Allergen Reactions   • Contrast Dye      KIDNEY TRANSPLANT         ROS:  Review of Systems   Constitution: Negative for malaise/fatigue.   HENT: Negative for hearing loss and nosebleeds.    Eyes: Negative for double vision and visual disturbance.   Cardiovascular: Negative for chest pain, claudication, dyspnea on exertion, near-syncope, orthopnea, palpitations, paroxysmal nocturnal dyspnea and syncope.   Respiratory: Negative for cough, shortness of breath, sleep disturbances due to breathing, snoring, sputum production and wheezing.    Endocrine: Negative for cold intolerance, heat intolerance, polydipsia and polyuria.   Hematologic/Lymphatic: Negative for adenopathy and bleeding problem. Does not bruise/bleed easily.   Skin: Negative for flushing and itching.   Musculoskeletal: Negative for back pain, falls, joint pain, joint swelling, muscle weakness and neck pain.   Gastrointestinal: Negative for abdominal pain, dysphagia, heartburn, nausea and vomiting.   Genitourinary: Negative for dysuria and frequency.   Neurological: Negative for disturbances in coordination, dizziness, light-headedness, loss of balance, numbness and weakness.   Psychiatric/Behavioral: Negative for altered mental status and memory loss. The patient is not nervous/anxious.    Allergic/Immunologic: Negative for hives and persistent infections.          Objective:         /81   Pulse 71   Ht 175.3 cm (69\")   Wt 103 kg (226 lb)   BMI 33.37 kg/m²     Physical Exam not performed    In-Office Procedure(s):  Procedures    ASCVD RIsk Score::  The ASCVD Risk score (Dionne TITUS Jr., et al., 2013) failed to calculate for the following reasons:    The valid " total cholesterol range is 130 to 320 mg/dL        Assessment/Plan:         1. Benign essential hypertension  Target 130/80 or less    2. Coronary arteriosclerosis  Stable    3. S/P CABG (coronary artery bypass graft)  Stable    4. Dyslipidemia  Continue low-cholesterol low saturated fat diet, Zetia and omega-3 Jacqui    5. Type 2 diabetes mellitus with other circulatory complication, with long-term current use of insulin (CMS/[facility])  Weight reduction, diet oral agent and insulin    6. Class 2 obesity due to excess calories without serious comorbidity with body mass index (BMI) of 35.0 to 35.9 in adult  Weight reduction    7. Obstructive sleep apnea on CPAP  Continue CPAP    8. Kidney transplanted      Mr. Omalley feels well and is improved.  His blood pressure has decreased but still not at target.  We will advance amlodipine to 10 mg daily.  I have encouraged him to pursue a low-cholesterol low saturated fat weight reduction diet with salt restriction is close to 2000 mg a day as possible.  He will monitor his blood pressure at home as well as any orthostatic symptoms or development of lower extremity edema.    I spent 12 minutes on video visit and 10 minutes completing electronic medical record documentation           Edmundo Conner MD  06/09/20  .

## 2020-07-23 ENCOUNTER — OFFICE VISIT (OUTPATIENT)
Dept: CARDIOLOGY | Facility: CLINIC | Age: 65
End: 2020-07-23

## 2020-07-23 VITALS
HEIGHT: 69 IN | SYSTOLIC BLOOD PRESSURE: 152 MMHG | DIASTOLIC BLOOD PRESSURE: 62 MMHG | WEIGHT: 226 LBS | BODY MASS INDEX: 33.47 KG/M2 | HEART RATE: 69 BPM

## 2020-07-23 DIAGNOSIS — I25.10 CORONARY ARTERIOSCLEROSIS: ICD-10-CM

## 2020-07-23 DIAGNOSIS — Z94.0 KIDNEY TRANSPLANTED: Primary | ICD-10-CM

## 2020-07-23 DIAGNOSIS — Z79.4 TYPE 2 DIABETES MELLITUS WITH OTHER CIRCULATORY COMPLICATION, WITH LONG-TERM CURRENT USE OF INSULIN (HCC): ICD-10-CM

## 2020-07-23 DIAGNOSIS — E78.5 DYSLIPIDEMIA: ICD-10-CM

## 2020-07-23 DIAGNOSIS — Z95.1 S/P CABG (CORONARY ARTERY BYPASS GRAFT): ICD-10-CM

## 2020-07-23 DIAGNOSIS — E66.09 CLASS 1 OBESITY DUE TO EXCESS CALORIES WITHOUT SERIOUS COMORBIDITY WITH BODY MASS INDEX (BMI) OF 33.0 TO 33.9 IN ADULT: ICD-10-CM

## 2020-07-23 DIAGNOSIS — Z99.89 OBSTRUCTIVE SLEEP APNEA ON CPAP: ICD-10-CM

## 2020-07-23 DIAGNOSIS — I10 HYPERTENSION WITH HYPERVOLEMIA: ICD-10-CM

## 2020-07-23 DIAGNOSIS — E11.59 TYPE 2 DIABETES MELLITUS WITH OTHER CIRCULATORY COMPLICATION, WITH LONG-TERM CURRENT USE OF INSULIN (HCC): ICD-10-CM

## 2020-07-23 DIAGNOSIS — E87.79 HYPERTENSION WITH HYPERVOLEMIA: ICD-10-CM

## 2020-07-23 DIAGNOSIS — G47.33 OBSTRUCTIVE SLEEP APNEA ON CPAP: ICD-10-CM

## 2020-07-23 PROCEDURE — 99214 OFFICE O/P EST MOD 30 MIN: CPT | Performed by: INTERNAL MEDICINE

## 2020-07-23 NOTE — PROGRESS NOTES
[facility] HE[facility] SPECIALISTS        Subjective:     Encounter Date:07/23/2020      Patient ID: Ken Omalley is a 64 y.o. male.      HPI: I saw the Ken Omalley today for cardiovascular care.  He is a pleasant 65-year-old male who continues to observe the CDC guidelines for social distancing.  He is free of fever cough or increased shortness of breath.  He does not report any change in his sense of smell or taste.  Mr. Omalley states that he is undergoing evaluation for possible Lyme disease.  He does not report chest pain pressure or tightness.  His respiratory status is stable and he is free of orthopnea or PND.  He has continued lower extremity edema.  His blood pressure in the office today is 152/62.  He states he has not taken his diuretic therapy today.  He denies syncope near syncope or palpitation.  He is status post kidney transplantation and is followed by nephrology.  He did not tolerate clonidine.      The following portions of the patient's history were reviewed and updated as appropriate: allergies, current medications, past family history, past medical history, past social history, past surgical history and problem list.    Problem List:  Patient Active Problem List   Diagnosis   • Surgery follow-up examination   • Dyslipidemia   • Kidney transplanted   • Hypothyroidism   • Type 2 diabetes mellitus with circulatory disorder, with long-term current use of insulin (CMS/HCC)   • Hypomagnesemia   • Benign essential hypertension   • Coronary arteriosclerosis   • Steatosis of liver   • H/O high risk medication treatment   • H/O echocardiogram   • H/O cardiovascular stress test   • H/O cardiac catheterization   • S/P CABG (coronary artery bypass graft)   • Hypertension with hypervolemia   • Obstructive sleep apnea on CPAP       Past Medical History:  Past Medical History:   Diagnosis Date   • Benign essential hypertension 8/14/2019   • Chronic kidney disease    • Coronary arteriosclerosis 8/14/2019     H/o CABG   • Depression    • Diabetes mellitus (CMS/HCC)    • Disease of thyroid gland    • GERD (gastroesophageal reflux disease)    • H/O cardiac catheterization 8/14/2019 11/28/2010 - Left main 50%.  LAD mid 100%.  LCX 50%, proximal 50%, small ramus branch mid 60%.  OM1 proximal 40%.  Zachary circumflex prior to PDA 60-70%.  RCA nondominant, mid 80%.  DA to LAD patent.  SVG to brittany small diagonal branch was patent, sequential SVT to ramus and mid marginal totally occluded.   • H/O echocardiogram 8/14/2019 03/27/2014 - EF 55-60%.  Mild concentric LVH.  Moderately dilated LA.  Mild MI and TI.  Trace AI.   • H/O high risk medication treatment 8/14/2019   • Heart attack (CMS/HCC)     2010   • Hypertension    • Hypomagnesemia 8/14/2019   • Hypothyroidism 8/14/2019   • Kidney failure    • Kidney transplant failure     2009   • Low back pain    • S/P CABG (coronary artery bypass graft) 8/14/2019 01/01/2004 - LIMA to the LAD, SVG to the diagonal branch, and sequential SVG to the intermediui artery and obtuse marginal branch of the circumflex.   • Sciatic pain     RIGHT    • Steatosis of liver 8/14/2019   • Wears hearing aid        Past Surgical History:  Past Surgical History:   Procedure Laterality Date   • CHOLECYSTECTOMY     • CORONARY ARTERY BYPASS GRAFT     • DIALYSIS FISTULA CREATION Left    • EPIDURAL BLOCK     • LUMBAR DISCECTOMY FUSION INSTRUMENTATION Right 10/17/2016    Procedure: Right L4 5 laminectomy and discectomy with Metrix;  Surgeon: Sergio Strange MD;  Location: [facility];  Service:    • TRANSPLANTATION RENAL      2006 2011   • VASECTOMY         Social History:  Social History     Socioeconomic History   • Marital status:      Spouse name: Not on file   • Number of children: Not on file   • Years of education: Not on file   • Highest education level: Not on file   Tobacco Use   • Smoking status: Never Smoker   • Smokeless tobacco: Never Used   Substance and Sexual Activity   •  "Alcohol use: No   • Drug use: No   • Sexual activity: Defer       Allergies:  Allergies   Allergen Reactions   • Contrast Dye Other (See Comments)     KIDNEY TRANSPLANT         ROS:  Review of Systems   Constitution: Negative for malaise/fatigue.   HENT: Negative for hearing loss and nosebleeds.    Eyes: Negative for double vision and visual disturbance.   Cardiovascular: Positive for leg swelling. Negative for chest pain, claudication, dyspnea on exertion, near-syncope, orthopnea, palpitations, paroxysmal nocturnal dyspnea and syncope.   Respiratory: Negative for cough, shortness of breath, sleep disturbances due to breathing, snoring, sputum production and wheezing.    Endocrine: Negative for cold intolerance, heat intolerance, polydipsia and polyuria.   Hematologic/Lymphatic: Negative for adenopathy and bleeding problem. Does not bruise/bleed easily.   Skin: Negative for flushing and itching.   Musculoskeletal: Negative for back pain, falls, joint pain, joint swelling, muscle weakness and neck pain.   Gastrointestinal: Negative for abdominal pain, dysphagia, heartburn, nausea and vomiting.   Genitourinary: Negative for dysuria and frequency.   Neurological: Negative for disturbances in coordination, dizziness, light-headedness, loss of balance, numbness and weakness.   Psychiatric/Behavioral: Negative for altered mental status and memory loss. The patient is not nervous/anxious.    Allergic/Immunologic: Negative for hives and persistent infections.          Objective:         /62   Pulse 69   Ht 175.3 cm (69\")   Wt 103 kg (226 lb)   BMI 33.37 kg/m²     Physical Exam   Constitutional: He is oriented to person, place, and time. He appears well-developed and well-nourished.   HENT:   Head: Normocephalic and atraumatic.   Eyes: Pupils are equal, round, and reactive to light. Conjunctivae and EOM are normal.   Neck: Neck supple. No thyromegaly present.   Cardiovascular: Normal rate, regular rhythm, S1 " normal, S2 normal and intact distal pulses. PMI is not displaced. Exam reveals gallop and S4. Exam reveals no S3, no distant heart sounds, no friction rub, no midsystolic click and no opening snap.   No murmur heard.  Pulses:       Carotid pulses are 2+ on the right side, and 2+ on the left side.       Radial pulses are 2+ on the right side, and 2+ on the left side.        Femoral pulses are 2+ on the right side, and 2+ on the left side.       Dorsalis pedis pulses are 2+ on the right side, and 2+ on the left side.        Posterior tibial pulses are 2+ on the right side, and 2+ on the left side.   Pulmonary/Chest: Effort normal and breath sounds normal. No respiratory distress. He has no wheezes. He has no rales.   Abdominal: Soft. Bowel sounds are normal. He exhibits no distension and no mass. There is no tenderness.   Musculoskeletal: Normal range of motion. He exhibits no edema.   1+ bilateral lower extremity edema   Neurological: He is alert and oriented to person, place, and time.   Skin: Skin is warm and dry. No erythema.   Psychiatric: He has a normal mood and affect.       In-Office Procedure(s):  Procedures    ASCVD RIsk Score::  The ASCVD Risk score ([place] DC Jr., et al., 2013) failed to calculate for the following reasons:    The valid total cholesterol range is 130 to 320 mg/dL        Assessment/Plan:         1. Kidney transplanted  Stable renal function    2. Hypertension with hypervolemia  Persistent    3. Coronary arteriosclerosis  Stable    4. S/P CABG (coronary artery bypass graft)  Stable    5. Dyslipidemia  Encourage low-cholesterol low saturated fat diet    6. Obstructive sleep apnea on CPAP  Weight reduction    7. Type 2 diabetes mellitus with other circulatory complication, with long-term current use of insulin (CMS/[facility])  Weight reduction    8. Class 1 obesity due to excess calories without serious comorbidity with body mass index (BMI) of 33.0 to 33.9 in adult  Weight reduction    Mr. Omalley  is free of angina and reports a stable respiratory status.  I feel he would benefit from further volume removal.  I will discuss this directly with his nephrologist.  Should he have confirmed evidence of Lyme disease we will obtain an echocardiogram and EKG.  Ideally I like to see his blood pressure under 130/80.  We will plan to see him in 3 months pending discussion with his nephrologist and findings of Lyme disease.           Edmundo Conner MD  07/23/20  .

## 2020-10-22 ENCOUNTER — TELEMEDICINE (OUTPATIENT)
Dept: CARDIOLOGY | Facility: CLINIC | Age: 65
End: 2020-10-22

## 2020-10-22 VITALS
BODY MASS INDEX: 32.88 KG/M2 | WEIGHT: 222 LBS | HEART RATE: 67 BPM | SYSTOLIC BLOOD PRESSURE: 132 MMHG | DIASTOLIC BLOOD PRESSURE: 78 MMHG | HEIGHT: 69 IN

## 2020-10-22 DIAGNOSIS — E78.5 DYSLIPIDEMIA: ICD-10-CM

## 2020-10-22 DIAGNOSIS — I25.10 CORONARY ARTERIOSCLEROSIS: ICD-10-CM

## 2020-10-22 DIAGNOSIS — I10 HYPERTENSION WITH HYPERVOLEMIA: ICD-10-CM

## 2020-10-22 DIAGNOSIS — I10 BENIGN ESSENTIAL HYPERTENSION: ICD-10-CM

## 2020-10-22 DIAGNOSIS — Z95.1 S/P CABG (CORONARY ARTERY BYPASS GRAFT): ICD-10-CM

## 2020-10-22 DIAGNOSIS — E87.79 HYPERTENSION WITH HYPERVOLEMIA: ICD-10-CM

## 2020-10-22 DIAGNOSIS — Z99.89 OBSTRUCTIVE SLEEP APNEA ON CPAP: ICD-10-CM

## 2020-10-22 DIAGNOSIS — Z94.0 KIDNEY TRANSPLANTED: ICD-10-CM

## 2020-10-22 DIAGNOSIS — G47.33 OBSTRUCTIVE SLEEP APNEA ON CPAP: ICD-10-CM

## 2020-10-22 DIAGNOSIS — E11.59 TYPE 2 DIABETES MELLITUS WITH OTHER CIRCULATORY COMPLICATION, WITH LONG-TERM CURRENT USE OF INSULIN (HCC): ICD-10-CM

## 2020-10-22 DIAGNOSIS — I51.9 LV DYSFUNCTION: Primary | ICD-10-CM

## 2020-10-22 DIAGNOSIS — Z79.4 TYPE 2 DIABETES MELLITUS WITH OTHER CIRCULATORY COMPLICATION, WITH LONG-TERM CURRENT USE OF INSULIN (HCC): ICD-10-CM

## 2020-10-22 PROCEDURE — 99214 OFFICE O/P EST MOD 30 MIN: CPT | Performed by: INTERNAL MEDICINE

## 2020-10-22 RX ORDER — TELMISARTAN 40 MG/1
1 TABLET ORAL DAILY
COMMUNITY
Start: 2020-10-01 | End: 2021-05-06 | Stop reason: ALTCHOICE

## 2020-10-22 RX ORDER — HYDRALAZINE HYDROCHLORIDE 100 MG/1
50 TABLET, FILM COATED ORAL 3 TIMES DAILY
Qty: 45 TABLET | Refills: 3 | Status: SHIPPED | OUTPATIENT
Start: 2020-10-22 | End: 2021-02-17

## 2020-10-22 NOTE — PROGRESS NOTES
[facility] HEART SPECIALISTS        Subjective:     Encounter Date:10/22/2020      Patient ID: Ken Omalley is a 65 y.o. male.      HPI: Ken Omalley agreed to a video visit today secondary to the coronavirus pandemic.  He is a pleasant 65-year-old male who observes the CDC guidelines for social distancing.  He remains free of fever cough or increased shortness of breath.  He does not report any change in his sense of smell or taste.  He is status post kidney transplantation in 2006 and 2011.  Mr. Omalley reports feeling well.  He does not report any chest pain pressure or tightness.  He remains free of any significant or progressive dyspnea on exertion.  He has had mild lower extremity edema but this is very limited.  He denies orthopnea or PND no syncope near syncope or palpitations.  He has known obstructive sleep apnea and uses CPAP routinely.  He has been found to have a right carpal tunnel syndrome and is to undergo surgery 11/3/2020.  His blood pressure is well controlled today at 132/78 with the addition of telmisartan to his medical regimen at 40 mg daily.  This was initiated by his nephrologist.  His hydralazine has been reduced to 50 mg 3 times a day.His last echocardiogram was obtained 1/30/2018 which showed left ventricular ejection fraction 40%, grade 2 diastolic dysfunction, mild mitral and tricuspid insufficiency with normal right ventricular systolic pressure.      The following portions of the patient's history were reviewed and updated as appropriate: allergies, current medications, past family history, past medical history, past social history, past surgical history and problem list.    Problem List:  Patient Active Problem List   Diagnosis   • Surgery follow-up examination   • Dyslipidemia   • Kidney transplanted   • Hypothyroidism   • Type 2 diabetes mellitus with circulatory disorder, with long-term current use of insulin (CMS/[facility])   • Hypomagnesemia   • Benign essential hypertension    • Coronary arteriosclerosis   • Steatosis of liver   • H/O high risk medication treatment   • H/O echocardiogram   • H/O cardiovascular stress test   • H/O cardiac catheterization   • S/P CABG (coronary artery bypass graft)   • Hypertension with hypervolemia   • Obstructive sleep apnea on CPAP       Past Medical History:  Past Medical History:   Diagnosis Date   • Benign essential hypertension 8/14/2019   • Chronic kidney disease    • Coronary arteriosclerosis 8/14/2019    H/o CABG   • Depression    • Diabetes mellitus (CMS/HCC)    • Disease of thyroid gland    • GERD (gastroesophageal reflux disease)    • H/O cardiac catheterization 8/14/2019 11/28/2010 - Left main 50%.  LAD mid 100%.  LCX 50%, proximal 50%, small ramus branch mid 60%.  OM1 proximal 40%.  Zachary circumflex prior to PDA 60-70%.  RCA nondominant, mid 80%.  DA to LAD patent.  SVG to brittany small diagonal branch was patent, sequential SVT to ramus and mid marginal totally occluded.   • H/O echocardiogram 8/14/2019 03/27/2014 - EF 55-60%.  Mild concentric LVH.  Moderately dilated LA.  Mild MI and TI.  Trace AI.   • H/O high risk medication treatment 8/14/2019   • Heart attack (CMS/HCC)     2010   • Hypertension    • Hypomagnesemia 8/14/2019   • Hypothyroidism 8/14/2019   • Kidney failure    • Kidney transplant failure     2009   • Low back pain    • S/P CABG (coronary artery bypass graft) 8/14/2019 01/01/2004 - LIMA to the LAD, SVG to the diagonal branch, and sequential SVG to the intermediui artery and obtuse marginal branch of the circumflex.   • Sciatic pain     RIGHT    • Steatosis of liver 8/14/2019   • Wears hearing aid        Past Surgical History:  Past Surgical History:   Procedure Laterality Date   • CHOLECYSTECTOMY     • CORONARY ARTERY BYPASS GRAFT     • DIALYSIS FISTULA CREATION Left    • EPIDURAL BLOCK     • LUMBAR DISCECTOMY FUSION INSTRUMENTATION Right 10/17/2016    Procedure: Right L4 5 laminectomy and discectomy with Metrix;   Surgeon: Sergio Strange MD;  Location: [facility];  Service:    • TRANSPLANTATION RENAL      2006 2011   • VASECTOMY         Social History:  Social History     Socioeconomic History   • Marital status:      Spouse name: Not on file   • Number of children: Not on file   • Years of education: Not on file   • Highest education level: Not on file   Tobacco Use   • Smoking status: Never Smoker   • Smokeless tobacco: Never Used   Substance and Sexual Activity   • Alcohol use: No   • Drug use: No   • Sexual activity: Defer       Allergies:  Allergies   Allergen Reactions   • Contrast Dye Other (See Comments)     KIDNEY TRANSPLANT         ROS:  Review of Systems   Constitution: Negative for malaise/fatigue.   HENT: Negative for hearing loss and nosebleeds.    Eyes: Negative for double vision and visual disturbance.   Cardiovascular: Positive for leg swelling. Negative for chest pain, claudication, dyspnea on exertion, near-syncope, orthopnea, palpitations, paroxysmal nocturnal dyspnea and syncope.   Respiratory: Negative for cough, shortness of breath, sleep disturbances due to breathing, snoring, sputum production and wheezing.    Endocrine: Negative for cold intolerance, heat intolerance, polydipsia and polyuria.   Hematologic/Lymphatic: Negative for adenopathy and bleeding problem. Does not bruise/bleed easily.   Skin: Negative for flushing and itching.   Musculoskeletal: Negative for back pain, falls, joint pain, joint swelling, muscle weakness and neck pain.   Gastrointestinal: Negative for abdominal pain, dysphagia, heartburn, nausea and vomiting.   Genitourinary: Negative for dysuria and frequency.   Neurological: Negative for disturbances in coordination, dizziness, light-headedness, loss of balance, numbness and weakness.   Psychiatric/Behavioral: Negative for altered mental status and memory loss. The patient is not nervous/anxious.    Allergic/Immunologic: Negative for hives and persistent  "infections.          Objective:         /78   Pulse 67   Ht 175.3 cm (69\")   Wt 101 kg (222 lb)   BMI 32.78 kg/m²     Physical Exam not performed    In-Office Procedure(s):  Procedures    ASCVD RIsk Score::  The ASCVD Risk score ([place] QURESHI JrConnie, et al., 2013) failed to calculate for the following reasons:    The valid total cholesterol range is 130 to 320 mg/dL        Assessment/Plan:         1. Coronary arteriosclerosis  Stable free of angina    2. S/P CABG (coronary artery bypass graft)  Stable    3. Hypertension with hypervolemia  Controlled    4.  Preoperative cardiac evaluation for right carpal tunnel surgery 11/3/2020    5. Type 2 diabetes mellitus with other circulatory complication, with long-term current use of insulin (CMS/[facility])  Continue diet oral agent and insulin control    6. Obstructive sleep apnea on CPAP  Encourage weight reduction continue CPAP    7. Dyslipidemia  Continue low-cholesterol low saturated fat diet and Zetia    8. Kidney transplanted  Stable    Mr. Omalley is free of angina and has a stable respiratory status.  He has a good activity level playing golf routinely but observes the CDC guidelines for social distancing.  We will obtain an echocardiogram preoperatively.  I feel he is a suitable candidate to proceed with planned surgery.  I have encouraged him to continue his current medical regimen, observe a low-cholesterol low saturated fat weight reduction diet and remain as active as possible while observing the CDC guidelines.  I will see him in follow-up in 6 months sooner if needed.     I spent 15 minutes on video visit and 12 minutes completing electronic medical record documentation.  Edmundo Conner MD  10/22/20  .    "

## 2020-10-28 DIAGNOSIS — E87.79 HYPERTENSION WITH HYPERVOLEMIA: Primary | ICD-10-CM

## 2020-10-28 DIAGNOSIS — I10 HYPERTENSION WITH HYPERVOLEMIA: Primary | ICD-10-CM

## 2020-10-28 RX ORDER — FUROSEMIDE 40 MG/1
TABLET ORAL
Qty: 90 TABLET | Refills: 1 | Status: SHIPPED | OUTPATIENT
Start: 2020-10-28 | End: 2021-04-22

## 2020-12-07 ENCOUNTER — HOSPITAL ENCOUNTER (OUTPATIENT)
Dept: CARDIOLOGY | Facility: HOSPITAL | Age: 65
Discharge: HOME | End: 2020-12-07
Admitting: INTERNAL MEDICINE

## 2020-12-07 VITALS
WEIGHT: 222 LBS | DIASTOLIC BLOOD PRESSURE: 79 MMHG | SYSTOLIC BLOOD PRESSURE: 143 MMHG | HEIGHT: 69 IN | BODY MASS INDEX: 32.88 KG/M2

## 2020-12-07 DIAGNOSIS — I51.9 LV DYSFUNCTION: ICD-10-CM

## 2020-12-07 LAB
AORTIC DIMENSIONLESS INDEX: 0.7 (DI)
BH CV ECHO MEAS - ACS: 1.8 CM
BH CV ECHO MEAS - AO MAX PG (FULL): 7.2 MMHG
BH CV ECHO MEAS - AO MAX PG: 12 MMHG
BH CV ECHO MEAS - AO MEAN PG (FULL): 4 MMHG
BH CV ECHO MEAS - AO MEAN PG: 6 MMHG
BH CV ECHO MEAS - AO ROOT AREA (BSA CORRECTED): 1.5
BH CV ECHO MEAS - AO ROOT AREA: 8 CM^2
BH CV ECHO MEAS - AO ROOT DIAM: 3.2 CM
BH CV ECHO MEAS - AO V2 MAX: 173 CM/SEC
BH CV ECHO MEAS - AO V2 MEAN: 114 CM/SEC
BH CV ECHO MEAS - AO V2 VTI: 36.2 CM
BH CV ECHO MEAS - AVA(I,A): 2.5 CM^2
BH CV ECHO MEAS - AVA(I,D): 2.5 CM^2
BH CV ECHO MEAS - AVA(V,A): 2.4 CM^2
BH CV ECHO MEAS - AVA(V,D): 2.4 CM^2
BH CV ECHO MEAS - BSA(HAYCOCK): 2.2 M^2
BH CV ECHO MEAS - BSA: 2.2 M^2
BH CV ECHO MEAS - BZI_BMI: 32.8 KILOGRAMS/M^2
BH CV ECHO MEAS - BZI_METRIC_HEIGHT: 175.3 CM
BH CV ECHO MEAS - BZI_METRIC_WEIGHT: 100.7 KG
BH CV ECHO MEAS - EDV(CUBED): 274.6 ML
BH CV ECHO MEAS - EDV(MOD-SP2): 121 ML
BH CV ECHO MEAS - EDV(MOD-SP4): 147 ML
BH CV ECHO MEAS - EDV(TEICH): 216 ML
BH CV ECHO MEAS - EF(CUBED): 64.6 %
BH CV ECHO MEAS - EF(MOD-BP): 49.1 %
BH CV ECHO MEAS - EF(MOD-SP2): 48.8 %
BH CV ECHO MEAS - EF(MOD-SP4): 49 %
BH CV ECHO MEAS - EF(TEICH): 54.9 %
BH CV ECHO MEAS - ESV(CUBED): 97.3 ML
BH CV ECHO MEAS - ESV(MOD-SP2): 62 ML
BH CV ECHO MEAS - ESV(MOD-SP4): 75 ML
BH CV ECHO MEAS - ESV(TEICH): 97.3 ML
BH CV ECHO MEAS - FS: 29.2 %
BH CV ECHO MEAS - IVS/LVPW: 1
BH CV ECHO MEAS - IVSD: 1.1 CM
BH CV ECHO MEAS - LAT PEAK E' VEL: 5.6 CM/SEC
BH CV ECHO MEAS - LV DIASTOLIC VOL/BSA (35-75): 68.1 ML/M^2
BH CV ECHO MEAS - LV MASS(C)D: 320 GRAMS
BH CV ECHO MEAS - LV MASS(C)DI: 148.2 GRAMS/M^2
BH CV ECHO MEAS - LV MAX PG: 4.8 MMHG
BH CV ECHO MEAS - LV MEAN PG: 2 MMHG
BH CV ECHO MEAS - LV SYSTOLIC VOL/BSA (12-30): 34.7 ML/M^2
BH CV ECHO MEAS - LV V1 MAX: 109 CM/SEC
BH CV ECHO MEAS - LV V1 MEAN: 68.2 CM/SEC
BH CV ECHO MEAS - LV V1 VTI: 23.9 CM
BH CV ECHO MEAS - LVIDD: 6.5 CM
BH CV ECHO MEAS - LVIDS: 4.6 CM
BH CV ECHO MEAS - LVLD AP2: 8.6 CM
BH CV ECHO MEAS - LVLD AP4: 8.5 CM
BH CV ECHO MEAS - LVLS AP2: 7.5 CM
BH CV ECHO MEAS - LVLS AP4: 7.7 CM
BH CV ECHO MEAS - LVOT AREA (M): 3.8 CM^2
BH CV ECHO MEAS - LVOT AREA: 3.8 CM^2
BH CV ECHO MEAS - LVOT DIAM: 2.2 CM
BH CV ECHO MEAS - LVPWD: 1.1 CM
BH CV ECHO MEAS - MED PEAK E' VEL: 7.5 CM/SEC
BH CV ECHO MEAS - MV A DUR: 0.13 SEC
BH CV ECHO MEAS - MV A MAX VEL: 85.9 CM/SEC
BH CV ECHO MEAS - MV DEC SLOPE: 566 CM/SEC^2
BH CV ECHO MEAS - MV DEC TIME: 280 SEC
BH CV ECHO MEAS - MV E MAX VEL: 147 CM/SEC
BH CV ECHO MEAS - MV E/A: 1.7
BH CV ECHO MEAS - MV MAX PG: 11.7 MMHG
BH CV ECHO MEAS - MV MEAN PG: 5 MMHG
BH CV ECHO MEAS - MV P1/2T MAX VEL: 178 CM/SEC
BH CV ECHO MEAS - MV P1/2T: 92.1 MSEC
BH CV ECHO MEAS - MV V2 MAX: 171 CM/SEC
BH CV ECHO MEAS - MV V2 MEAN: 100 CM/SEC
BH CV ECHO MEAS - MV V2 VTI: 48.7 CM
BH CV ECHO MEAS - MVA P1/2T LCG: 1.2 CM^2
BH CV ECHO MEAS - MVA(P1/2T): 2.4 CM^2
BH CV ECHO MEAS - MVA(VTI): 1.9 CM^2
BH CV ECHO MEAS - PA ACC TIME: 0.11 SEC
BH CV ECHO MEAS - PA MAX PG (FULL): 5.2 MMHG
BH CV ECHO MEAS - PA MAX PG: 6.8 MMHG
BH CV ECHO MEAS - PA PR(ACCEL): 27.7 MMHG
BH CV ECHO MEAS - PA V2 MAX: 130 CM/SEC
BH CV ECHO MEAS - PULM DIAS VEL: 84.1 CM/SEC
BH CV ECHO MEAS - PULM S/D: 0.69
BH CV ECHO MEAS - PULM SYS VEL: 57.9 CM/SEC
BH CV ECHO MEAS - PVA(V,A): 2.4 CM^2
BH CV ECHO MEAS - PVA(V,D): 2.4 CM^2
BH CV ECHO MEAS - QP/QS: 0.82
BH CV ECHO MEAS - RAP SYSTOLE: 3 MMHG
BH CV ECHO MEAS - RV MAX PG: 1.6 MMHG
BH CV ECHO MEAS - RV MEAN PG: 1 MMHG
BH CV ECHO MEAS - RV V1 MAX: 62.7 CM/SEC
BH CV ECHO MEAS - RV V1 MEAN: 40.8 CM/SEC
BH CV ECHO MEAS - RV V1 VTI: 15.1 CM
BH CV ECHO MEAS - RVOT AREA: 4.9 CM^2
BH CV ECHO MEAS - RVOT DIAM: 2.5 CM
BH CV ECHO MEAS - SI(AO): 134.8 ML/M^2
BH CV ECHO MEAS - SI(CUBED): 82.1 ML/M^2
BH CV ECHO MEAS - SI(LVOT): 42.1 ML/M^2
BH CV ECHO MEAS - SI(MOD-SP2): 27.3 ML/M^2
BH CV ECHO MEAS - SI(MOD-SP4): 33.3 ML/M^2
BH CV ECHO MEAS - SI(TEICH): 55 ML/M^2
BH CV ECHO MEAS - SV(AO): 291.1 ML
BH CV ECHO MEAS - SV(CUBED): 177.3 ML
BH CV ECHO MEAS - SV(LVOT): 90.9 ML
BH CV ECHO MEAS - SV(MOD-SP2): 59 ML
BH CV ECHO MEAS - SV(MOD-SP4): 72 ML
BH CV ECHO MEAS - SV(RVOT): 74.1 ML
BH CV ECHO MEAS - SV(TEICH): 118.7 ML
BH CV ECHO MEAS - TAPSE (>1.6): 2.7 CM
BH CV ECHO MEASUREMENTS AVERAGE E/E' RATIO: 22.44
BH CV VAS BP RIGHT ARM: NORMAL MMHG
BH CV XLRA - RV BASE: 3.5 CM
BH CV XLRA - TDI S': 11.3 CM/SEC
LEFT ATRIUM VOLUME INDEX: 46 ML/M2
MAXIMAL PREDICTED HEART RATE: 157 BPM
STRESS TARGET HR: 133 BPM

## 2020-12-07 PROCEDURE — 93306 TTE W/DOPPLER COMPLETE: CPT | Performed by: INTERNAL MEDICINE

## 2020-12-07 PROCEDURE — 93306 TTE W/DOPPLER COMPLETE: CPT

## 2021-02-17 DIAGNOSIS — I10 BENIGN ESSENTIAL HYPERTENSION: ICD-10-CM

## 2021-02-17 RX ORDER — HYDRALAZINE HYDROCHLORIDE 100 MG/1
TABLET, FILM COATED ORAL
Qty: 45 TABLET | Refills: 5 | Status: SHIPPED | OUTPATIENT
Start: 2021-02-17 | End: 2021-09-20

## 2021-04-21 DIAGNOSIS — I10 HYPERTENSION WITH HYPERVOLEMIA: ICD-10-CM

## 2021-04-21 DIAGNOSIS — E87.79 HYPERTENSION WITH HYPERVOLEMIA: ICD-10-CM

## 2021-04-22 RX ORDER — FUROSEMIDE 40 MG/1
TABLET ORAL
Qty: 90 TABLET | Refills: 2 | Status: SHIPPED | OUTPATIENT
Start: 2021-04-22 | End: 2021-12-16 | Stop reason: SDUPTHER

## 2021-05-06 ENCOUNTER — OFFICE VISIT (OUTPATIENT)
Dept: CARDIOLOGY | Facility: CLINIC | Age: 66
End: 2021-05-06

## 2021-05-06 VITALS
DIASTOLIC BLOOD PRESSURE: 70 MMHG | BODY MASS INDEX: 35.55 KG/M2 | HEIGHT: 69 IN | HEART RATE: 67 BPM | SYSTOLIC BLOOD PRESSURE: 148 MMHG | WEIGHT: 240 LBS

## 2021-05-06 DIAGNOSIS — Z79.4 TYPE 2 DIABETES MELLITUS WITH OTHER CIRCULATORY COMPLICATION, WITH LONG-TERM CURRENT USE OF INSULIN (HCC): ICD-10-CM

## 2021-05-06 DIAGNOSIS — Z94.0 KIDNEY TRANSPLANTED: ICD-10-CM

## 2021-05-06 DIAGNOSIS — E66.09 CLASS 1 OBESITY DUE TO EXCESS CALORIES WITHOUT SERIOUS COMORBIDITY WITH BODY MASS INDEX (BMI) OF 33.0 TO 33.9 IN ADULT: ICD-10-CM

## 2021-05-06 DIAGNOSIS — E78.5 DYSLIPIDEMIA: ICD-10-CM

## 2021-05-06 DIAGNOSIS — E87.79 HYPERTENSION WITH HYPERVOLEMIA: ICD-10-CM

## 2021-05-06 DIAGNOSIS — Z99.89 OBSTRUCTIVE SLEEP APNEA ON CPAP: ICD-10-CM

## 2021-05-06 DIAGNOSIS — I10 HYPERTENSION WITH HYPERVOLEMIA: ICD-10-CM

## 2021-05-06 DIAGNOSIS — Z95.1 S/P CABG (CORONARY ARTERY BYPASS GRAFT): ICD-10-CM

## 2021-05-06 DIAGNOSIS — E11.59 TYPE 2 DIABETES MELLITUS WITH OTHER CIRCULATORY COMPLICATION, WITH LONG-TERM CURRENT USE OF INSULIN (HCC): ICD-10-CM

## 2021-05-06 DIAGNOSIS — I25.10 CORONARY ARTERIOSCLEROSIS: Primary | ICD-10-CM

## 2021-05-06 DIAGNOSIS — G47.33 OBSTRUCTIVE SLEEP APNEA ON CPAP: ICD-10-CM

## 2021-05-06 PROCEDURE — 99214 OFFICE O/P EST MOD 30 MIN: CPT | Performed by: INTERNAL MEDICINE

## 2021-05-06 RX ORDER — TERAZOSIN 2 MG/1
2 CAPSULE ORAL NIGHTLY
Qty: 30 CAPSULE | Refills: 2 | Status: SHIPPED | OUTPATIENT
Start: 2021-05-06 | End: 2021-09-30 | Stop reason: ALTCHOICE

## 2021-05-06 RX ORDER — TELMISARTAN 80 MG/1
1 TABLET ORAL DAILY
COMMUNITY
Start: 2021-04-27

## 2021-05-06 NOTE — PROGRESS NOTES
"Chief Complaint  Coronary Artery Disease    Subjective    History of Present Illness      I saw Kaila Omalley today for continued cardiovascular care.  He is a very pleasant 65-year-old male who was Covid positive last year.  He continues to observe the CDC guidelines during the coronavirus pandemic.  Mr. Omalley is active playing golf routinely.  He does not report chest pain pressure tightness.  He is free of any significant or progressive dyspnea on exertion.  He denies orthopnea or PND but has persistent 1+ to 2+ lower extremity bilateral edema.  He denies syncope near syncope or any significant palpitations.  In the office his blood pressure is elevated 148/70 he monitors his blood pressure at home and his systolic is consistently above 130 mmHg.  He underwent kidney transplantation in 2006 and then again in 2011.  He is followed by the transplant team.  He has obstructive sleep apnea and uses CPAP routinely.  Review of his previous echocardiogram obtained December 7, 2020 reveals left ventricular ejection fraction 49% with mild mitral tricuspid insufficiency.  He has a history of coronary heart disease status post coronary artery bypass surgery on 1/1/2004 at which time he had a LIMA to the LAD, saphenous vein graft to diagonal, sequential saphenous vein graft to the ramus intermedius and obtuse marginal branch of the circumflex.  He remains obese with a BMI 35.44    Objective   Vital Signs:   /70   Pulse 67   Ht 175.3 cm (69\")   Wt 109 kg (240 lb)   BMI 35.44 kg/m²     Constitutional:       Appearance: Well-developed. Obese.   Eyes:      Conjunctiva/sclera: Conjunctivae normal.      Pupils: Pupils are equal, round, and reactive to light.   HENT:      Head: Normocephalic and atraumatic.   Neck:      Thyroid: No thyromegaly.   Pulmonary:      Effort: Pulmonary effort is normal. No respiratory distress.      Breath sounds: Normal breath sounds. No wheezing. No rales.   Cardiovascular:      Normal rate. " Regular rhythm.      S4 Gallop. No S3 gallop. Midsystolic click click.   Edema:     Peripheral edema present.     Pretibial: bilateral 1+ edema of the pretibial area.  Abdominal:      General: Bowel sounds are normal. There is no distension.      Palpations: Abdomen is soft. There is no abdominal mass.      Tenderness: There is no abdominal tenderness.   Musculoskeletal: Normal range of motion.      Cervical back: Neck supple. Skin:     General: Skin is warm and dry.      Findings: No erythema.   Neurological:      Mental Status: Alert and oriented to person, place, and time.         Result Review :     Common labs    Common Labsle 5/12/20 5/12/20 7/21/20    [id number]    Hematocrit   42.5   Hemoglobin A1C 8.8 (A)     PSA  0.46    (A) Abnormal value       Comments are available for some flowsheets but are not being displayed.           Data reviewed: Cardiology studies Echocardiogram 12/7/2020          Assessment and Plan    1. Coronary arteriosclerosis  Stable free of angina    2. S/P CABG (coronary artery bypass graft)  Stable    3. Hypertension with hypervolemia  Target consistently less than 130/80  - terazosin (HYTRIN) 2 MG capsule; Take 1 capsule by mouth Every Night.  Dispense: 30 capsule; Refill: 2    4. Dyslipidemia  Continue low-cholesterol low saturated fat weight reduction diet and Zetia 10 mg daily.    5. Type 2 diabetes mellitus with other circulatory complication, with long-term current use of insulin (CMS/[facility])  Weight reduction    6. Class 1 obesity due to excess calories without serious comorbidity with body mass index (BMI) of 33.0 to 33.9 in adult  Weight reduction    7. Obstructive sleep apnea on CPAP  Weight reduction    8. Kidney transplanted  Stable    Mr. Omalley remains active free of angina and does not report any significant respiratory insufficiency.  I have encouraged him to restrict his salt intake and observe a low-cholesterol low saturated fat weight reduction diet.  I will initiate  Terazosin 2 mg daily.  He will monitor his blood pressure at home and report back in 2 weeks.  Should we see a improvement in his blood pressure perhaps I will be able to increase Terazosin and reduce amlodipine to hopefully reduce his lower extremity edema.  I will tentatively plan to see him in follow-up in 3 months sooner if needed.    Follow Up   No follow-ups on file.  Patient was given instructions and counseling regarding his condition or for health maintenance advice. Please see specific information pulled into the AVS if appropriate.

## 2021-05-17 DIAGNOSIS — I10 BENIGN ESSENTIAL HYPERTENSION: ICD-10-CM

## 2021-05-17 RX ORDER — AMLODIPINE BESYLATE 10 MG/1
TABLET ORAL
Qty: 30 TABLET | Refills: 11 | Status: SHIPPED | OUTPATIENT
Start: 2021-05-17 | End: 2021-12-16 | Stop reason: SDUPTHER

## 2021-05-24 ENCOUNTER — TELEPHONE (OUTPATIENT)
Dept: CARDIOLOGY | Facility: CLINIC | Age: 66
End: 2021-05-24

## 2021-05-24 NOTE — TELEPHONE ENCOUNTER
Called pt back to see what his BP is. Today was 137/65, HR 70 (which is a typical reading). He is taking Amlodipine 10mg qd so his swelling still persists and maybe even a little worse. No SOA and pt has been active, feeling well.    He also takes Lasix 40mg qd, Hydralazine 100mg-1/2 tab TID, Metoprolol Tart 100mg BID and Terazosin 2mg QHS.     Please advise.    Thanks,  Mary Anne

## 2021-05-24 NOTE — TELEPHONE ENCOUNTER
CNA PT    DR Wyatt put PT on Terazosin 2 mg for his B/P and was to let us know how he is doing.  PT called and said it is not helping and is no better.

## 2021-09-20 DIAGNOSIS — I10 BENIGN ESSENTIAL HYPERTENSION: ICD-10-CM

## 2021-09-20 RX ORDER — HYDRALAZINE HYDROCHLORIDE 100 MG/1
TABLET, FILM COATED ORAL
Qty: 45 TABLET | Refills: 5 | Status: SHIPPED | OUTPATIENT
Start: 2021-09-20 | End: 2021-12-16 | Stop reason: SDUPTHER

## 2021-09-30 ENCOUNTER — OFFICE VISIT (OUTPATIENT)
Dept: CARDIOLOGY | Facility: CLINIC | Age: 66
End: 2021-09-30

## 2021-09-30 VITALS
BODY MASS INDEX: 34.8 KG/M2 | HEART RATE: 71 BPM | SYSTOLIC BLOOD PRESSURE: 142 MMHG | WEIGHT: 235 LBS | DIASTOLIC BLOOD PRESSURE: 72 MMHG | HEIGHT: 69 IN

## 2021-09-30 DIAGNOSIS — Z99.89 OBSTRUCTIVE SLEEP APNEA ON CPAP: ICD-10-CM

## 2021-09-30 DIAGNOSIS — Z94.0 KIDNEY TRANSPLANTED: ICD-10-CM

## 2021-09-30 DIAGNOSIS — Z95.1 S/P CABG (CORONARY ARTERY BYPASS GRAFT): ICD-10-CM

## 2021-09-30 DIAGNOSIS — E11.59 TYPE 2 DIABETES MELLITUS WITH OTHER CIRCULATORY COMPLICATION, WITH LONG-TERM CURRENT USE OF INSULIN (HCC): ICD-10-CM

## 2021-09-30 DIAGNOSIS — I10 BENIGN ESSENTIAL HYPERTENSION: ICD-10-CM

## 2021-09-30 DIAGNOSIS — G47.33 OBSTRUCTIVE SLEEP APNEA ON CPAP: ICD-10-CM

## 2021-09-30 DIAGNOSIS — E78.5 DYSLIPIDEMIA: ICD-10-CM

## 2021-09-30 DIAGNOSIS — I25.10 CORONARY ARTERIOSCLEROSIS: Primary | ICD-10-CM

## 2021-09-30 DIAGNOSIS — Z79.4 TYPE 2 DIABETES MELLITUS WITH OTHER CIRCULATORY COMPLICATION, WITH LONG-TERM CURRENT USE OF INSULIN (HCC): ICD-10-CM

## 2021-09-30 PROCEDURE — 99215 OFFICE O/P EST HI 40 MIN: CPT | Performed by: INTERNAL MEDICINE

## 2021-09-30 RX ORDER — POTASSIUM CHLORIDE 20 MEQ/1
1 TABLET, EXTENDED RELEASE ORAL DAILY
COMMUNITY
Start: 2021-08-11 | End: 2022-03-31 | Stop reason: ALTCHOICE

## 2021-09-30 RX ORDER — ICOSAPENT ETHYL 1000 MG/1
2 CAPSULE ORAL 2 TIMES DAILY WITH MEALS
Qty: 120 CAPSULE | Refills: 5 | Status: SHIPPED | OUTPATIENT
Start: 2021-09-30 | End: 2021-12-16 | Stop reason: SDUPTHER

## 2021-09-30 NOTE — PROGRESS NOTES
Chief Complaint  No chief complaint on file.    Subjective    History of Present Illness      I saw Morgan Omalley today for continued cardiovascular care.  He is a very pleasant 66-year-old male status post kidney transplantation in 2006 and again 2011.  He has known coronary heart disease status post previous coronary artery bypass surgery 1/1/2004.  He remains free of angina.  He does not report any significant or progressive dyspnea on exertion and denies orthopnea or PND.  He has chronic bilateral lower extremity edema possibly related to amlodipine.  I previously initiated Terazosin with a gradual increase in dosage but no significant improvement in his blood pressure and he therefore discontinued the medication.  Plan was to hopefully advance Terazosin and reduce amlodipine.  He denies syncope near syncope or any significant palpitations.  Recent lab data has shown marked increase in his triglyceride level.  Fasting triglyceride on 8/20/2021 was 1189.  He previously was on Vascepa but he discontinued this in the past.  His blood pressure is 142/72 in the office.  He has obstructive sleep apnea and uses CPAP.  He remains obese with a BMI of 34.7.    Objective   Vital Signs:   There were no vitals taken for this visit.    Constitutional:       Appearance: Well-developed. Obese.   Eyes:      Conjunctiva/sclera: Conjunctivae normal.      Pupils: Pupils are equal, round, and reactive to light.   HENT:      Head: Normocephalic and atraumatic.   Neck:      Thyroid: No thyromegaly.   Pulmonary:      Effort: Pulmonary effort is normal. No respiratory distress.      Breath sounds: Normal breath sounds. No wheezing. No rales.   Cardiovascular:      Normal rate. Regular rhythm.      S4 Gallop. No S3 gallop. Midsystolic click click.   Edema:     Peripheral edema present.     Pretibial: bilateral 1+ edema of the pretibial area.  Abdominal:      General: Bowel sounds are normal. There is no distension.      Palpations:  Abdomen is soft. There is no abdominal mass.      Tenderness: There is no abdominal tenderness.   Musculoskeletal: Normal range of motion.      Cervical back: Neck supple. Skin:     General: Skin is warm and dry.      Findings: No erythema.   Neurological:      Mental Status: Alert and oriented to person, place, and time.     Result Review :     Common labs    Common Labsle 8/20/21 8/20/21    0857 0857   Hemoglobin A1C 7.6 (A)    PSA  0.59   (A) Abnormal value       Comments are available for some flowsheets but are not being displayed.                     Assessment and Plan    1. Coronary arteriosclerosis  Stable free of angina    2. S/P CABG (coronary artery bypass graft)  Stable    3. Benign essential hypertension  Adequately controlled    4. Dyslipidemia  Marked hypertriglyceridemia    5. Type 2 diabetes mellitus with other circulatory complication, with long-term current use of insulin ([facility])      6. Obstructive sleep apnea on CPAP      7. Kidney transplanted      Mr. Omalley denies angina or respiratory insufficiency.  He is reluctant at the present time to pursue any further medication changes in an effort to reduce amlodipine.  I will initiate Vascepa 2 g twice daily.  His lipids are monitored through the transplant team.  I will plan to see him in follow-up in 4 months sooner if needed.  Mr. Omalley had several questions concerning his medications and medical problems.  I answered them today to his satisfaction.      I spent 45 minutes caring for Ken on this date of service. This time includes time spent by me in the following activities:preparing for the visit, performing a medically appropriate examination and/or evaluation , counseling and educating the patient/family/caregiver, ordering medications, tests, or procedures, referring and communicating with other health care professionals , documenting information in the medical record and care coordination  Follow Up   No follow-ups on file.  Patient  was given instructions and counseling regarding his condition or for health maintenance advice. Please see specific information pulled into the AVS if appropriate.

## 2021-10-04 ENCOUNTER — TELEPHONE (OUTPATIENT)
Dept: CARDIOLOGY | Facility: CLINIC | Age: 66
End: 2021-10-04

## 2021-10-04 NOTE — TELEPHONE ENCOUNTER
"CNA  Pt called stating he has fluttering \"funny feeling\" in chest and hrt rate increased form 60's to the 80's after being on Vasepa 2mg BID since last Thursday 9/30/21    PT CB# #[phone]  "

## 2021-10-04 NOTE — TELEPHONE ENCOUNTER
Please check and see if patient is willing to continue Vascepa if he does and continues with palpitations to let me know please thank you.

## 2021-12-16 DIAGNOSIS — E78.5 DYSLIPIDEMIA: Primary | ICD-10-CM

## 2021-12-16 DIAGNOSIS — E87.79 HYPERTENSION WITH HYPERVOLEMIA: ICD-10-CM

## 2021-12-16 DIAGNOSIS — I10 HYPERTENSION WITH HYPERVOLEMIA: ICD-10-CM

## 2021-12-16 DIAGNOSIS — I10 BENIGN ESSENTIAL HYPERTENSION: ICD-10-CM

## 2021-12-16 RX ORDER — ICOSAPENT ETHYL 1000 MG/1
2 CAPSULE ORAL 2 TIMES DAILY WITH MEALS
Qty: 360 CAPSULE | Refills: 3 | Status: SHIPPED | OUTPATIENT
Start: 2021-12-16

## 2021-12-16 RX ORDER — FUROSEMIDE 40 MG/1
40 TABLET ORAL DAILY
Qty: 90 TABLET | Refills: 1 | Status: SHIPPED | OUTPATIENT
Start: 2021-12-16 | End: 2022-07-15

## 2021-12-16 RX ORDER — HYDRALAZINE HYDROCHLORIDE 100 MG/1
50 TABLET, FILM COATED ORAL 3 TIMES DAILY
Qty: 135 TABLET | Refills: 3 | Status: SHIPPED | OUTPATIENT
Start: 2021-12-16 | End: 2022-12-12 | Stop reason: SDUPTHER

## 2021-12-16 RX ORDER — AMLODIPINE BESYLATE 10 MG/1
10 TABLET ORAL DAILY
Qty: 90 TABLET | Refills: 3 | Status: SHIPPED | OUTPATIENT
Start: 2021-12-16 | End: 2022-03-31 | Stop reason: ALTCHOICE

## 2022-03-10 ENCOUNTER — TELEPHONE (OUTPATIENT)
Dept: CARDIOLOGY | Facility: CLINIC | Age: 67
End: 2022-03-10

## 2022-03-10 NOTE — TELEPHONE ENCOUNTER
----- Message from Arian Omalley sent at 3/10/2022 11:16 AM EST -----  Regarding: covid  Hi Dr. Conner,  It's your old friend arian. I have been feeling bad on and off for a few days. [facility] tested I have covid. I'm on 3 different meds. all and all I feel ok. other than stupid coughing. My bp's are ok as well. Just wanted to keep you in the know. There is more reading on my chart.  Again old friend arian

## 2022-03-30 NOTE — PROGRESS NOTES
"Chief Complaint  Coronary Artery Disease    Subjective    History of Present Illness      I saw Ken Omalley today for cardiovascular care.  He is a pleasant 66-year-old male status post kidney transplantation 2006 and again in 2011.  He underwent coronary artery bypass surgery 1/1/2004.  He remains free of chest pain pressure or tightness.  He reports being COVID-19 positive earlier this month and with the subsequent development of pneumonia treated with antibiotics.  His respiratory status is improving.  He is free of fever or cough currently.  His dyspnea on exertion is gradually subsiding.  He discontinued his furosemide on his own as he was worried he was becoming dehydrated.  He does not report orthopnea or PND sleeping with 1 pillow.  He does have mild lower extremity edema current.  His blood pressure is mildly elevated at 148/64.  His lower extremity edema is markedly improved with the discontinuation of amlodipine.  He has a history of hyperlipidemia remains on Vascepa 2 g twice daily.  His BMI is 34.41    Objective   Vital Signs:   /64   Pulse 83   Ht 175.3 cm (69\")   Wt 106 kg (233 lb)   BMI 34.41 kg/m²     Constitutional:       Appearance: Well-developed.   Eyes:      Conjunctiva/sclera: Conjunctivae normal.      Pupils: Pupils are equal, round, and reactive to light.   HENT:      Head: Normocephalic and atraumatic.   Neck:      Thyroid: No thyromegaly.   Pulmonary:      Effort: Pulmonary effort is normal. No respiratory distress.      Breath sounds: Normal breath sounds. No wheezing. No rales.   Cardiovascular:      Normal rate. Regular rhythm.      S4 Gallop. No S3 gallop. No rub.   Edema:     Peripheral edema present.     Pretibial: bilateral 1+ edema of the pretibial area.  Abdominal:      General: Bowel sounds are normal. There is no distension.      Palpations: Abdomen is soft. There is no abdominal mass.      Tenderness: There is no abdominal tenderness.   Musculoskeletal: Normal " range of motion.      Cervical back: Neck supple. Skin:     General: Skin is warm and dry.      Findings: No erythema.   Neurological:      Mental Status: Alert and oriented to person, place, and time.         Result Review :     Common labs    Common Labsle 2/24/22 3/8/22 3/25/22 3/25/22      1425 1425   Glucose  134 (A)  128 (A)   BUN  11  12   Creatinine  0.85  0.74   Sodium  132 (A)  139   Potassium  4.8  5.1   Chloride  96 (A)  102   Calcium  8.6  9.2   WBC   6.65    Hemoglobin   11.9 (A)    Hematocrit   38.9 (A)    Platelets   249    Hemoglobin A1C 10.0 (A)      (A) Abnormal value                      Assessment and Plan    1. Kidney transplanted  Followed by [facility] transplant team    2. Coronary arteriosclerosis  Stable free of angina    3. S/P CABG (coronary artery bypass graft)  Stable    4. Benign essential hypertension  Salt restriction    5. Dyslipidemia  Low-cholesterol low saturated fat diet continue Vascepa    6. Type 2 diabetes mellitus with other circulatory complication, with long-term current use of insulin ([facility])  Weight reduction continue CPAP and weight reduction    7. Obstructive sleep apnea on CPAP      Ken is free of angina.  His respiratory status is gradually improving following COVID-19 infection/pneumonia.  I feel he would benefit from resumption of his furosemide at 40 mg daily and I really recommended he resume the medication.  I have encouraged him to observe a low-cholesterol low saturated fat diet and remain active.  He is to follow-up with nephrology next Tuesday.  I will see him in follow-up in 6 months.        Follow Up   No follow-ups on file.  Patient was given instructions and counseling regarding his condition or for health maintenance advice. Please see specific information pulled into the AVS if appropriate.

## 2022-03-31 ENCOUNTER — OFFICE VISIT (OUTPATIENT)
Dept: CARDIOLOGY | Facility: CLINIC | Age: 67
End: 2022-03-31

## 2022-03-31 VITALS
SYSTOLIC BLOOD PRESSURE: 148 MMHG | WEIGHT: 233 LBS | HEIGHT: 69 IN | HEART RATE: 83 BPM | BODY MASS INDEX: 34.51 KG/M2 | DIASTOLIC BLOOD PRESSURE: 64 MMHG

## 2022-03-31 DIAGNOSIS — I25.10 CORONARY ARTERIOSCLEROSIS: ICD-10-CM

## 2022-03-31 DIAGNOSIS — Z99.89 OBSTRUCTIVE SLEEP APNEA ON CPAP: ICD-10-CM

## 2022-03-31 DIAGNOSIS — G47.33 OBSTRUCTIVE SLEEP APNEA ON CPAP: ICD-10-CM

## 2022-03-31 DIAGNOSIS — Z79.4 TYPE 2 DIABETES MELLITUS WITH OTHER CIRCULATORY COMPLICATION, WITH LONG-TERM CURRENT USE OF INSULIN: ICD-10-CM

## 2022-03-31 DIAGNOSIS — Z94.0 KIDNEY TRANSPLANTED: Primary | ICD-10-CM

## 2022-03-31 DIAGNOSIS — Z95.1 S/P CABG (CORONARY ARTERY BYPASS GRAFT): ICD-10-CM

## 2022-03-31 DIAGNOSIS — I10 BENIGN ESSENTIAL HYPERTENSION: ICD-10-CM

## 2022-03-31 DIAGNOSIS — E11.59 TYPE 2 DIABETES MELLITUS WITH OTHER CIRCULATORY COMPLICATION, WITH LONG-TERM CURRENT USE OF INSULIN: ICD-10-CM

## 2022-03-31 DIAGNOSIS — E78.5 DYSLIPIDEMIA: ICD-10-CM

## 2022-03-31 PROCEDURE — 99214 OFFICE O/P EST MOD 30 MIN: CPT | Performed by: INTERNAL MEDICINE

## 2022-07-15 DIAGNOSIS — E87.79 HYPERTENSION WITH HYPERVOLEMIA: ICD-10-CM

## 2022-07-15 DIAGNOSIS — I10 HYPERTENSION WITH HYPERVOLEMIA: ICD-10-CM

## 2022-07-15 RX ORDER — FUROSEMIDE 40 MG/1
TABLET ORAL
Qty: 90 TABLET | Refills: 1 | Status: SHIPPED | OUTPATIENT
Start: 2022-07-15

## 2022-10-05 NOTE — PROGRESS NOTES
"Chief Complaint  Coronary Artery Disease    Subjective    History of Present Illness      I saw a Morgan Omalley today for cardiovascular care.  He is a very pleasant 67-year-old male status post kidney transplantation 2006 and redo in 2011.  He underwent coronary artery bypass surgery 1/1/2004.  He is free of angina.  His respiratory status is stable.  He does not report orthopnea or PND sleeping with 1 pillow.  He has lower extremity edema but it is improved.  He remains active playing golf routinely.  He is free of syncope near syncope no significant palpitations.  His BMI remains 34.11.  Recent fasting lipid profile revealed triglyceride level of 1290, total cholesterol 441.  He is maintained on Vascepa 2 g twice daily, Zetia 10 mg daily.    Objective   Vital Signs:   /72   Pulse 78   Ht 175.3 cm (69\")   Wt 105 kg (231 lb)   BMI 34.11 kg/m²     Constitutional:       Appearance: Well-developed.   Eyes:      Conjunctiva/sclera: Conjunctivae normal.      Pupils: Pupils are equal, round, and reactive to light.   HENT:      Head: Normocephalic and atraumatic.   Neck:      Thyroid: No thyromegaly.   Pulmonary:      Effort: Pulmonary effort is normal. No respiratory distress.      Breath sounds: Normal breath sounds. No wheezing. No rales.   Cardiovascular:      Normal rate. Regular rhythm.      S4 Gallop. No S3 gallop. No rub.   Edema:     Peripheral edema present.     Pretibial: bilateral trace edema of the pretibial area.  Abdominal:      General: Bowel sounds are normal. There is no distension.      Palpations: Abdomen is soft. There is no abdominal mass.      Tenderness: There is no abdominal tenderness.   Musculoskeletal: Normal range of motion.      Cervical back: Neck supple. Skin:     General: Skin is warm and dry.      Findings: No erythema.   Neurological:      Mental Status: Alert and oriented to person, place, and time.         Result Review :     Common labs    Common Labs 5/26/22 5/26/22 " 7/8/22 9/12/22    0745 0745     Glucose  185 (A)     BUN  14     Creatinine  0.28 (A) 87.20    Sodium  133 (A)     Potassium  4.8     Chloride  97 (A)     Calcium  9.2     Hemoglobin A1C 8.6 (A)   9.9 (A)   (A) Abnormal value       Comments are available for some flowsheets but are not being displayed.                     Assessment and Plan    1. Coronary arteriosclerosis  Stable free of angina    2. S/P CABG (coronary artery bypass graft)  Stable    3. Benign essential hypertension  Controlled    4. Dyslipidemia  Marked hyper triglyceridemia    5. Type 2 diabetes mellitus with other circulatory complication, with long-term current use of insulin ([facility])    6. Kidney transplanted      Ken is free of angina and does not report significant respiratory insufficiency.  I have encouraged him to observe a low-cholesterol weight reduction diet.  I agree with evaluation by endocrinology.  I will arrange cardiovascular follow-up in 6 months sooner if needed.        Follow Up   No follow-ups on file.  Patient was given instructions and counseling regarding his condition or for health maintenance advice. Please see specific information pulled into the AVS if appropriate.

## 2022-10-06 ENCOUNTER — OFFICE VISIT (OUTPATIENT)
Dept: CARDIOLOGY | Facility: CLINIC | Age: 67
End: 2022-10-06

## 2022-10-06 VITALS
DIASTOLIC BLOOD PRESSURE: 72 MMHG | HEART RATE: 78 BPM | BODY MASS INDEX: 34.21 KG/M2 | WEIGHT: 231 LBS | SYSTOLIC BLOOD PRESSURE: 134 MMHG | HEIGHT: 69 IN

## 2022-10-06 DIAGNOSIS — I10 BENIGN ESSENTIAL HYPERTENSION: ICD-10-CM

## 2022-10-06 DIAGNOSIS — I25.10 CORONARY ARTERIOSCLEROSIS: Primary | ICD-10-CM

## 2022-10-06 DIAGNOSIS — Z94.0 KIDNEY TRANSPLANTED: ICD-10-CM

## 2022-10-06 DIAGNOSIS — E78.5 DYSLIPIDEMIA: ICD-10-CM

## 2022-10-06 DIAGNOSIS — Z79.4 TYPE 2 DIABETES MELLITUS WITH OTHER CIRCULATORY COMPLICATION, WITH LONG-TERM CURRENT USE OF INSULIN: ICD-10-CM

## 2022-10-06 DIAGNOSIS — E11.59 TYPE 2 DIABETES MELLITUS WITH OTHER CIRCULATORY COMPLICATION, WITH LONG-TERM CURRENT USE OF INSULIN: ICD-10-CM

## 2022-10-06 DIAGNOSIS — Z95.1 S/P CABG (CORONARY ARTERY BYPASS GRAFT): ICD-10-CM

## 2022-10-06 PROCEDURE — 99214 OFFICE O/P EST MOD 30 MIN: CPT | Performed by: INTERNAL MEDICINE

## 2022-12-12 DIAGNOSIS — I10 BENIGN ESSENTIAL HYPERTENSION: ICD-10-CM

## 2022-12-12 RX ORDER — HYDRALAZINE HYDROCHLORIDE 100 MG/1
50 TABLET, FILM COATED ORAL 3 TIMES DAILY
Qty: 135 TABLET | Refills: 3 | Status: SHIPPED | OUTPATIENT
Start: 2022-12-12

## 2023-04-08 ENCOUNTER — APPOINTMENT (OUTPATIENT)
Dept: GENERAL RADIOLOGY | Facility: HOSPITAL | Age: 68
End: 2023-04-08
Payer: MEDICARE

## 2023-04-08 ENCOUNTER — HOSPITAL ENCOUNTER (OUTPATIENT)
Facility: HOSPITAL | Age: 68
Setting detail: OBSERVATION
Discharge: HOME | End: 2023-04-09
Attending: EMERGENCY MEDICINE | Admitting: EMERGENCY MEDICINE
Payer: MEDICARE

## 2023-04-08 ENCOUNTER — APPOINTMENT (OUTPATIENT)
Dept: CT IMAGING | Facility: HOSPITAL | Age: 68
End: 2023-04-08
Payer: MEDICARE

## 2023-04-08 ENCOUNTER — APPOINTMENT (OUTPATIENT)
Dept: MRI IMAGING | Facility: HOSPITAL | Age: 68
End: 2023-04-08
Payer: MEDICARE

## 2023-04-08 DIAGNOSIS — M54.9 INTRACTABLE BACK PAIN: ICD-10-CM

## 2023-04-08 DIAGNOSIS — M54.16 RIGHT LUMBAR RADICULOPATHY: Primary | ICD-10-CM

## 2023-04-08 PROBLEM — M54.50 RIGHT LOW BACK PAIN: Status: ACTIVE | Noted: 2023-04-08

## 2023-04-08 LAB
ALBUMIN SERPL-MCNC: 3.5 G/DL (ref 3.5–5.2)
ALBUMIN/GLOB SERPL: 1.1 G/DL
ALP SERPL-CCNC: 67 U/L (ref 39–117)
ALT SERPL W P-5'-P-CCNC: 19 U/L (ref 1–41)
ANION GAP SERPL CALCULATED.3IONS-SCNC: 15 MMOL/L (ref 5–15)
AST SERPL-CCNC: 26 U/L (ref 1–40)
BASOPHILS # BLD AUTO: 0 10*3/MM3 (ref 0–0.2)
BASOPHILS NFR BLD AUTO: 0 % (ref 0–1.5)
BILIRUB SERPL-MCNC: 0.6 MG/DL (ref 0–1.2)
BUN SERPL-MCNC: 15 MG/DL (ref 8–23)
BUN/CREAT SERPL: 13.4 (ref 7–25)
CALCIUM SPEC-SCNC: 9.1 MG/DL (ref 8.6–10.5)
CHLORIDE SERPL-SCNC: 96 MMOL/L (ref 98–107)
CO2 SERPL-SCNC: 20 MMOL/L (ref 22–29)
CREAT SERPL-MCNC: 1.12 MG/DL (ref 0.76–1.27)
DEPRECATED RDW RBC AUTO: 44.8 FL (ref 37–54)
EGFRCR SERPLBLD CKD-EPI 2021: 72.9 ML/MIN/1.73
EOSINOPHIL # BLD AUTO: 0 10*3/MM3 (ref 0–0.4)
EOSINOPHIL NFR BLD AUTO: 0 % (ref 0.3–6.2)
ERYTHROCYTE [DISTWIDTH] IN BLOOD BY AUTOMATED COUNT: 14.6 % (ref 12.3–15.4)
GLOBULIN UR ELPH-MCNC: 3.1 GM/DL
GLUCOSE BLDC GLUCOMTR-MCNC: 186 MG/DL (ref 70–130)
GLUCOSE SERPL-MCNC: 357 MG/DL (ref 65–99)
HCT VFR BLD AUTO: 43.1 % (ref 37.5–51)
HGB BLD-MCNC: 14.1 G/DL (ref 13–17.7)
IMM GRANULOCYTES # BLD AUTO: 0.01 10*3/MM3 (ref 0–0.05)
IMM GRANULOCYTES NFR BLD AUTO: 0.2 % (ref 0–0.5)
LYMPHOCYTES # BLD AUTO: 0.27 10*3/MM3 (ref 0.7–3.1)
LYMPHOCYTES NFR BLD AUTO: 6.7 % (ref 19.6–45.3)
MCH RBC QN AUTO: 27.2 PG (ref 26.6–33)
MCHC RBC AUTO-ENTMCNC: 32.7 G/DL (ref 31.5–35.7)
MCV RBC AUTO: 83.2 FL (ref 79–97)
MONOCYTES # BLD AUTO: 0.03 10*3/MM3 (ref 0.1–0.9)
MONOCYTES NFR BLD AUTO: 0.7 % (ref 5–12)
NEUTROPHILS NFR BLD AUTO: 3.7 10*3/MM3 (ref 1.7–7)
NEUTROPHILS NFR BLD AUTO: 92.4 % (ref 42.7–76)
NRBC BLD AUTO-RTO: 0 /100 WBC (ref 0–0.2)
PLATELET # BLD AUTO: 210 10*3/MM3 (ref 140–450)
PMV BLD AUTO: 9.6 FL (ref 6–12)
POTASSIUM SERPL-SCNC: 5.1 MMOL/L (ref 3.5–5.2)
PROT SERPL-MCNC: 6.6 G/DL (ref 6–8.5)
RBC # BLD AUTO: 5.18 10*6/MM3 (ref 4.14–5.8)
SODIUM SERPL-SCNC: 131 MMOL/L (ref 136–145)
WBC NRBC COR # BLD: 4.01 10*3/MM3 (ref 3.4–10.8)

## 2023-04-08 PROCEDURE — 71046 X-RAY EXAM CHEST 2 VIEWS: CPT

## 2023-04-08 PROCEDURE — 72131 CT LUMBAR SPINE W/O DYE: CPT

## 2023-04-08 PROCEDURE — 72158 MRI LUMBAR SPINE W/O & W/DYE: CPT

## 2023-04-08 PROCEDURE — 85025 COMPLETE CBC W/AUTO DIFF WBC: CPT | Performed by: PHYSICIAN ASSISTANT

## 2023-04-08 PROCEDURE — 63710000001 INSULIN LISPRO (HUMAN) PER 5 UNITS: Performed by: PHYSICIAN ASSISTANT

## 2023-04-08 PROCEDURE — 0 GADOBENATE DIMEGLUMINE 529 MG/ML SOLUTION: Performed by: EMERGENCY MEDICINE

## 2023-04-08 PROCEDURE — 25010000002 DEXAMETHASONE SODIUM PHOSPHATE 10 MG/ML SOLUTION: Performed by: PHYSICIAN ASSISTANT

## 2023-04-08 PROCEDURE — 82962 GLUCOSE BLOOD TEST: CPT

## 2023-04-08 PROCEDURE — G0378 HOSPITAL OBSERVATION PER HR: HCPCS

## 2023-04-08 PROCEDURE — 96372 THER/PROPH/DIAG INJ SC/IM: CPT

## 2023-04-08 PROCEDURE — 99285 EMERGENCY DEPT VISIT HI MDM: CPT

## 2023-04-08 PROCEDURE — 80053 COMPREHEN METABOLIC PANEL: CPT | Performed by: PHYSICIAN ASSISTANT

## 2023-04-08 PROCEDURE — A9577 INJ MULTIHANCE: HCPCS | Performed by: EMERGENCY MEDICINE

## 2023-04-08 RX ORDER — NALOXONE HCL 0.4 MG/ML
0.4 VIAL (ML) INJECTION
Status: DISCONTINUED | OUTPATIENT
Start: 2023-04-08 | End: 2023-04-09 | Stop reason: HOSPADM

## 2023-04-08 RX ORDER — MORPHINE SULFATE 2 MG/ML
1 INJECTION, SOLUTION INTRAMUSCULAR; INTRAVENOUS EVERY 4 HOURS PRN
Status: DISCONTINUED | OUTPATIENT
Start: 2023-04-08 | End: 2023-04-09 | Stop reason: HOSPADM

## 2023-04-08 RX ORDER — SODIUM CHLORIDE 0.9 % (FLUSH) 0.9 %
10 SYRINGE (ML) INJECTION EVERY 12 HOURS SCHEDULED
Status: DISCONTINUED | OUTPATIENT
Start: 2023-04-08 | End: 2023-04-09 | Stop reason: HOSPADM

## 2023-04-08 RX ORDER — LIDOCAINE 50 MG/G
1 PATCH TOPICAL EVERY 24 HOURS
Qty: 6 EACH | Refills: 0 | Status: SHIPPED | OUTPATIENT
Start: 2023-04-08 | End: 2023-04-08

## 2023-04-08 RX ORDER — HYDROCODONE BITARTRATE AND ACETAMINOPHEN 5; 325 MG/1; MG/1
1 TABLET ORAL ONCE
Status: COMPLETED | OUTPATIENT
Start: 2023-04-08 | End: 2023-04-08

## 2023-04-08 RX ORDER — HYDROCODONE BITARTRATE AND ACETAMINOPHEN 7.5; 325 MG/1; MG/1
1 TABLET ORAL EVERY 4 HOURS PRN
Status: DISCONTINUED | OUTPATIENT
Start: 2023-04-08 | End: 2023-04-09 | Stop reason: HOSPADM

## 2023-04-08 RX ORDER — NICOTINE POLACRILEX 4 MG
15 LOZENGE BUCCAL
Status: DISCONTINUED | OUTPATIENT
Start: 2023-04-08 | End: 2023-04-09 | Stop reason: HOSPADM

## 2023-04-08 RX ORDER — SODIUM CHLORIDE 9 MG/ML
40 INJECTION, SOLUTION INTRAVENOUS AS NEEDED
Status: DISCONTINUED | OUTPATIENT
Start: 2023-04-08 | End: 2023-04-09 | Stop reason: HOSPADM

## 2023-04-08 RX ORDER — METHOCARBAMOL 750 MG/1
750 TABLET, FILM COATED ORAL 3 TIMES DAILY PRN
Qty: 15 TABLET | Refills: 0 | Status: SHIPPED | OUTPATIENT
Start: 2023-04-08 | End: 2023-04-08

## 2023-04-08 RX ORDER — LIDOCAINE 50 MG/G
1 PATCH TOPICAL ONCE
Status: COMPLETED | OUTPATIENT
Start: 2023-04-08 | End: 2023-04-09

## 2023-04-08 RX ORDER — DEXTROSE MONOHYDRATE 25 G/50ML
25 INJECTION, SOLUTION INTRAVENOUS
Status: DISCONTINUED | OUTPATIENT
Start: 2023-04-08 | End: 2023-04-09 | Stop reason: HOSPADM

## 2023-04-08 RX ORDER — AMIODARONE HYDROCHLORIDE 200 MG/1
200 TABLET ORAL DAILY
COMMUNITY

## 2023-04-08 RX ORDER — SODIUM CHLORIDE 0.9 % (FLUSH) 0.9 %
10 SYRINGE (ML) INJECTION AS NEEDED
Status: DISCONTINUED | OUTPATIENT
Start: 2023-04-08 | End: 2023-04-09 | Stop reason: HOSPADM

## 2023-04-08 RX ORDER — METHOCARBAMOL 750 MG/1
750 TABLET, FILM COATED ORAL ONCE
Status: COMPLETED | OUTPATIENT
Start: 2023-04-08 | End: 2023-04-08

## 2023-04-08 RX ORDER — BUMETANIDE 2 MG/1
2 TABLET ORAL DAILY
COMMUNITY

## 2023-04-08 RX ORDER — DEXAMETHASONE SODIUM PHOSPHATE 10 MG/ML
10 INJECTION, SOLUTION INTRAMUSCULAR; INTRAVENOUS ONCE
Status: COMPLETED | OUTPATIENT
Start: 2023-04-08 | End: 2023-04-08

## 2023-04-08 RX ORDER — IBUPROFEN 600 MG/1
1 TABLET ORAL
Status: DISCONTINUED | OUTPATIENT
Start: 2023-04-08 | End: 2023-04-09 | Stop reason: HOSPADM

## 2023-04-08 RX ORDER — SODIUM CHLORIDE, SODIUM LACTATE, POTASSIUM CHLORIDE, CALCIUM CHLORIDE 600; 310; 30; 20 MG/100ML; MG/100ML; MG/100ML; MG/100ML
50 INJECTION, SOLUTION INTRAVENOUS CONTINUOUS
Status: DISCONTINUED | OUTPATIENT
Start: 2023-04-08 | End: 2023-04-09 | Stop reason: HOSPADM

## 2023-04-08 RX ORDER — ONDANSETRON 4 MG/1
4 TABLET, FILM COATED ORAL EVERY 6 HOURS PRN
Status: DISCONTINUED | OUTPATIENT
Start: 2023-04-08 | End: 2023-04-09 | Stop reason: HOSPADM

## 2023-04-08 RX ORDER — INSULIN LISPRO 100 [IU]/ML
0-14 INJECTION, SOLUTION INTRAVENOUS; SUBCUTANEOUS
Status: DISCONTINUED | OUTPATIENT
Start: 2023-04-08 | End: 2023-04-09

## 2023-04-08 RX ORDER — ONDANSETRON 2 MG/ML
4 INJECTION INTRAMUSCULAR; INTRAVENOUS EVERY 6 HOURS PRN
Status: DISCONTINUED | OUTPATIENT
Start: 2023-04-08 | End: 2023-04-09 | Stop reason: HOSPADM

## 2023-04-08 RX ORDER — HYDROCODONE BITARTRATE AND ACETAMINOPHEN 5; 325 MG/1; MG/1
1 TABLET ORAL EVERY 6 HOURS PRN
COMMUNITY

## 2023-04-08 RX ORDER — NITROGLYCERIN 0.4 MG/1
0.4 TABLET SUBLINGUAL
Status: DISCONTINUED | OUTPATIENT
Start: 2023-04-08 | End: 2023-04-09 | Stop reason: HOSPADM

## 2023-04-08 RX ORDER — ACETAMINOPHEN 325 MG/1
650 TABLET ORAL EVERY 4 HOURS PRN
Status: DISCONTINUED | OUTPATIENT
Start: 2023-04-08 | End: 2023-04-09 | Stop reason: HOSPADM

## 2023-04-08 RX ADMIN — SODIUM CHLORIDE, POTASSIUM CHLORIDE, SODIUM LACTATE AND CALCIUM CHLORIDE 50 ML/HR: 600; 310; 30; 20 INJECTION, SOLUTION INTRAVENOUS at 23:11

## 2023-04-08 RX ADMIN — METHOCARBAMOL TABLETS 750 MG: 750 TABLET, COATED ORAL at 14:28

## 2023-04-08 RX ADMIN — GADOBENATE DIMEGLUMINE 20 ML: 529 INJECTION, SOLUTION INTRAVENOUS at 20:22

## 2023-04-08 RX ADMIN — Medication 10 ML: at 23:10

## 2023-04-08 RX ADMIN — LIDOCAINE 1 PATCH: 700 PATCH TOPICAL at 14:27

## 2023-04-08 RX ADMIN — DEXAMETHASONE SODIUM PHOSPHATE 10 MG: 10 INJECTION, SOLUTION INTRAMUSCULAR; INTRAVENOUS at 14:28

## 2023-04-08 RX ADMIN — HYDROCODONE BITARTRATE AND ACETAMINOPHEN 1 TABLET: 5; 325 TABLET ORAL at 16:24

## 2023-04-08 RX ADMIN — INSULIN LISPRO 12 UNITS: 100 INJECTION, SOLUTION INTRAVENOUS; SUBCUTANEOUS at 22:55

## 2023-04-08 NOTE — ED TRIAGE NOTES
Back pain radiates to groin.  Had heart cath last Thursday - pain in groin is at the cath site.  Bilat lower leg edema - is on bumex.  He  reports bilat leg weakness

## 2023-04-08 NOTE — ED PROVIDER NOTES
" EMERGENCY DEPARTMENT ENCOUNTER    Room Number:  05/05  Date of encounter:  4/8/2023  PCP: Delfina Whipple MD  Historian: Patient  Full history not obtainable due to: None    HPI:  Chief Complaint: Groin pain    Context: Ken Omalley is a 67 y.o. male with a PMH significant for hyperlipidemia, type 2 diabetes, chronic low back pain, CAD status post CABG and multiple stent placement who presents to the ED c/o right-sided low back pain that radiates around to the right groin and has been present for the past 7 days.  The patient had a heart catheterization 9 days ago and started developing pain to the right groin a couple of days later.  He went to an outlying emergency room for evaluation initially and after an extensive work-up he was discharged home without a clear cause of his symptoms.  He felt better for couple of days but has become progressively more uncomfortable since then and now is having difficulty standing and ambulating secondary to discomfort.  He was started on steroids for presumed sciatica by his PCP today and took his first dose this morning.  Denies numbness or weakness to the extremities, bowel or bladder incontinence, saddle paresthesias.  He has a remote history of lumbar laminectomy \"many years ago\".      MEDICAL RECORD REVIEW:    Upon review of the medical record it appears the patient was evaluated at an outlying ER 7 days ago for right groin pain.  He had a negative arterial ultrasound of the right lower extremity to evaluate for pseudoaneurysm.  He also had a CT abdomen and pelvis that was negative at that time.    PAST MEDICAL HISTORY    Active Ambulatory Problems     Diagnosis Date Noted   • Surgery follow-up examination 11/01/2016   • Dyslipidemia 06/10/2016   • Kidney transplanted 05/30/2014   • Hypothyroidism 08/14/2019   • Type 2 diabetes mellitus with circulatory disorder, with long-term current use of insulin 08/14/2019   • Hypomagnesemia 08/14/2019   • Benign essential " hypertension 08/14/2019   • Coronary arteriosclerosis 08/14/2019   • Steatosis of liver 08/14/2019   • H/O high risk medication treatment 08/14/2019   • H/O echocardiogram 08/14/2019   • H/O cardiovascular stress test 08/14/2019   • H/O cardiac catheterization 08/14/2019   • S/P CABG (coronary artery bypass graft) 08/14/2019   • Hypertension with hypervolemia 08/15/2019   • Obstructive sleep apnea on CPAP 08/15/2019     Resolved Ambulatory Problems     Diagnosis Date Noted   • Neuritis or radiculitis due to rupture of lumbar intervertebral disc 08/04/2016   • Class 2 obesity due to excess calories without serious comorbidity with body mass index (BMI) of 35.0 to 35.9 in adult 08/15/2019     Past Medical History:   Diagnosis Date   • Chronic kidney disease    • Depression    • Diabetes mellitus    • Disease of thyroid gland    • GERD (gastroesophageal reflux disease)    • Heart attack    • Hypertension    • Kidney failure    • Kidney transplant failure    • Low back pain    • Sciatic pain    • Wears hearing aid          PAST SURGICAL HISTORY  Past Surgical History:   Procedure Laterality Date   • CHOLECYSTECTOMY     • CORONARY ARTERY BYPASS GRAFT     • DIALYSIS FISTULA CREATION Left    • EPIDURAL BLOCK     • LUMBAR DISCECTOMY FUSION INSTRUMENTATION Right 10/17/2016    Procedure: Right L4 5 laminectomy and discectomy with Metrix;  Surgeon: Sergio Strange MD;  Location: [facility];  Service:    • TRANSPLANTATION RENAL      2006 2011   • VASECTOMY           FAMILY HISTORY  Family History   Problem Relation Age of Onset   • Hypertension Mother    • COPD Father          SOCIAL HISTORY  Social History     Socioeconomic History   • Marital status:    Tobacco Use   • Smoking status: Never   • Smokeless tobacco: Never   Substance and Sexual Activity   • Alcohol use: No   • Drug use: No   • Sexual activity: Defer         ALLERGIES  Contrast dye (echo or unknown ct/mr)        REVIEW OF SYSTEMS    All systems  reviewed and marked as negative except as listed in HPI     PHYSICAL EXAM    I have reviewed the triage vital signs and nursing notes.    ED Triage Vitals [23 1254]   Temp Heart Rate Resp BP SpO2   98.5 °F (36.9 °C) 84 16 128/59 97 %      Temp src Heart Rate Source Patient Position BP Location FiO2 (%)   Oral Monitor -- -- --       Physical Exam  Constitutional:       General: He is not in acute distress.     Appearance: He is well-developed.   HENT:      Head: Normocephalic and atraumatic.   Eyes:      General: No scleral icterus.     Conjunctiva/sclera: Conjunctivae normal.   Neck:      Trachea: No tracheal deviation.   Cardiovascular:      Rate and Rhythm: Normal rate and regular rhythm.   Pulmonary:      Effort: Pulmonary effort is normal.      Breath sounds: Normal breath sounds.   Abdominal:      Palpations: Abdomen is soft.      Tenderness: There is no abdominal tenderness. There is no guarding.   Musculoskeletal:         General: No deformity.      Cervical back: Normal range of motion.      Lumbar back: No tenderness or bony tenderness. Decreased range of motion. Positive right straight leg raise test. Negative left straight leg raise test.      Right lower le+ Edema present.      Left lower le+ Edema present.   Lymphadenopathy:      Cervical: No cervical adenopathy.   Skin:     General: Skin is warm and dry.   Neurological:      Mental Status: He is alert and oriented to person, place, and time.      Sensory: Sensation is intact.      Motor: Motor function is intact.   Psychiatric:         Behavior: Behavior normal.         Vital signs and nursing notes reviewed.            LAB RESULTS  Recent Results (from the past 24 hour(s))   POC Glucose Once    Collection Time: 23  2:31 PM    Specimen: Blood   Result Value Ref Range    Glucose 186 (H) 70 - 130 mg/dL       Ordered the above labs and independently reviewed the results.        RADIOLOGY  CT Lumbar Spine Without Contrast    Result  Date: 4/8/2023  CT LUMBAR SPINE WITHOUT CONTRAST  HISTORY: Back pain, right radiculopathy, previous lumbar surgery.  COMPARISON: None.  FINDINGS: Bilateral pleural fluid collections are appreciated, only partially visualized, larger on the right and moderate on the left. The kidneys are atrophic bilaterally. Moderate-to-severe vascular calcification involving the abdominal aorta and common iliac arteries bilaterally is appreciated.  There is moderate-to-severe loss of disc height at L4-L5, more prominent on the right.  Anterior bridging osteophyte is appreciated at T11-T12.  T12-L1: There is no evidence of disc bulge or herniation.  L1-L2: There is no evidence of disc bulge or herniation.  L2-L3: There is mild canal stenosis secondary to a broad-based disc osteophyte complex. The disc osteophyte complex contributes to mild lateral recess narrowing and foraminal stenosis bilaterally.  L3-L4: There is mild-to-moderate central canal stenosis secondary to a central and left paramedian disc osteophyte complex which also extends into the neural foramen bilaterally contributing to mild foraminal stenosis.  L4-L5: A right laminotomy is noted. A broad-based disc osteophyte complex is present which results in mild canal stenosis. Moderate foraminal stenosis is present on the right and there is mild-to-moderate foraminal stenosis on the left secondary to loss of disc height and extension of a disc osteophyte complex into the neural foramen.  L5-S1: A mild central disc osteophyte complex is present with no evidence of herniation. Moderate-to-severe degenerative disease involving the sacroiliac joints is appreciated bilaterally with fusion of the SI joints anteriorly bilaterally noted. A central broad-based disc osteophyte complex is present which abuts but does not appear to displace the traversing S1 nerve roots. Mild foraminal stenosis is present bilaterally secondary to extension of a small disc osteophyte complex into the  neural foramen.      1. Bilateral pleural fluid collections are noted larger on the right, only partially visualized. AP and lateral view of the chest is recommended. 2. A hiatal hernia is noted, moderate in size. 3. A right laminotomy is appreciated at L4-L5. Multilevel degenerative disease involving the lumbar spine is noted as described in detail above with no evidence of disc herniation. This includes multilevel broad-based disc osteophyte complexes and mild-to-moderate foraminal stenosis bilaterally. Moderate degenerative disease involving the sacroiliac joints bilaterally is appreciated. Further evaluation could be performed with a MRI examination of the lumbar spine with and without contrast as indicated.    Radiation dose reduction techniques were utilized, including automated exposure control and exposure modulation based on body size.         I ordered the above noted radiological studies. Independently reviewed by me and discussed with radiologist.  See dictation above for official radiology interpretation.      PROCEDURES    Procedures        MEDICATIONS GIVEN IN ER    Medications   lidocaine (LIDODERM) 5 % 1 patch (1 patch Transdermal Medication Applied 4/8/23 1427)   dexamethasone sodium phosphate injection 10 mg (10 mg Intramuscular Given 4/8/23 1428)   methocarbamol (ROBAXIN) tablet 750 mg (750 mg Oral Given 4/8/23 1428)   HYDROcodone-acetaminophen (NORCO) 5-325 MG per tablet 1 tablet (1 tablet Oral Given 4/8/23 1624)         PROGRESS, DATA ANALYSIS, CONSULTS, AND MEDICAL DECISION MAKING    All labs have been independently interpreted by me.  All radiology studies have been interpreted by me.  Discussion below represents my analysis of pertinent findings related to patient's condition, differential diagnosis, treatment plan and final disposition.    Patient presentation and work-up consistent with right-sided lumbar radiculopathy.  His CT scan supports my clinical findings on exam with chronic  degenerative changes to the lumbar spine including spinal stenosis.  After attempting pain control with narcotics, steroids, Lidoderm, muscle relaxers in the ED he continues to have significant difficulty ambulating even with a walker and assistance.  I feel uncomfortable discharging him home at this time and plan to admit to the observation unit for further management.    - Chronic or social conditions impacting care: None      DIFFERENTIAL DIAGNOSIS INCLUDE BUT NOT LIMITED TO:     Differential diagnosis includes but is not limited to:    - Lumbar strain  - Lumbar radiculopathy  - Lumbar disc bulge/herniation  - Lumbar spinal stenosis  - Vertebral fracture  - Cauda equina syndrome  - Vertebral osteomyelitis  - Kidney stone  - Shingles      Orders placed during this visit:  Orders Placed This Encounter   Procedures   • CT Lumbar Spine Without Contrast   • Cardiac Monitoring   • POC Glucose Once   • Initiate ED Observation Status         ED Course as of 04/08/23 1716   Sat Apr 08, 2023   1539 I viewed CT Lumbar spine on PACS system.  My interpretation is no acute fracture.     [DC]   1710 Attempted to ambulate the patient at this time.  With a walker and strong assistance he continued with significant difficulty ambulating despite muscle relaxers, Lidoderm, narcotics in the ED.  Plan to admit to the hospital for further pain management and consultation with neurosurgery as needed. [DC]   1715 I discussed the case with ALE Delaney at this time regarding the patient.  I discussed work-up, results, concerns.  I discussed the consulting provider's desire for obs admit.   [DC]      ED Course User Index  [DC] Herberth Goddard PA       AS OF 17:16 EDT VITALS:    BP - 148/76  HR - 89  TEMP - 98.5 °F (36.9 °C) (Oral)  02 SATS - 97%    1621 I rechecked the patient.  I discussed the patient's labs, radiology findings (including all incidental findings), diagnosis, and plan for obs admission.  The patient understands and agrees  with the plan, and is ready for d/c.  All questions answered.      DIAGNOSIS  Final diagnoses:   Right lumbar radiculopathy   Intractable back pain         DISPOSITION  Admit    Pt masked in first look. I wore a surgical mask throughout my encounters with the pt. I performed hand hygiene on entry into the pt room and upon exit.     Dictated utilizing Dragon dictation     Note Disclaimer: At [facility], we believe that sharing information builds trust and better relationships. You are receiving this note because you recently visited [facility]. It is possible you will see health information before a provider has talked with you about it. This kind of information can be easy to misunderstand. To help you fully understand what it means for your health, we urge you to discuss this note with your provider.      Herberth Goddard PA  04/08/23 [id number]

## 2023-04-08 NOTE — H&P
[facility]   HISTORY AND PHYSICAL    Patient Name: Ken Omalley  : 1955  MRN: [id number]  Primary Care Physician:  Delfina Whipple MD  Date of admission: 2023    Subjective   Subjective     Chief Complaint:   Chief Complaint   Patient presents with   • Groin Pain         HPI:    Ken Omalley is a 67 y.o. male with history of diabetes, hypothyroidism, hypertension, hyperlipidemia, CAD, history of kidney failure with presence of kidney transplant on immunosuppressants, and history of degenerative disc disease status post L5-S1 laminotomy, who presented to the emergency department today complaining of right low back pain that radiates to his right groin.  Patient actually had heart catheterization 9 days ago.  Since then, he has developed right groin pain.  He went to [place] ER on  and had x-ray of his pelvis, ultrasound of his femoral artery to assess for pseudoaneurysm, and CT abdomen and pelvis, all of which were unrevealing.  Patient was discharged home with walker and pain medication and steroids and muscle relaxers and reports he had improvement for a few days but then pain became intolerable and he was unable to ambulate this morning.    ED evaluation reviewed, CT of lumbar spine today shows multilevel degenerative disease involving the lumbar spine.  Patient will be admitted to observation unit for further evaluation of pain including MRI of lumbar spine and neurosurgery consultation.    Review of Systems   All systems were reviewed and negative except for: What is discussed in the HPI    Personal History     Past Medical History:   Diagnosis Date   • Benign essential hypertension 2019   • Chronic kidney disease    • Coronary arteriosclerosis 2019    H/o CABG   • Depression    • Diabetes mellitus    • Disease of thyroid gland    • GERD (gastroesophageal reflux disease)    • H/O cardiac catheterization 2010 - Left main 50%.  LAD mid 100%.  LCX 50%,  proximal 50%, small ramus branch mid 60%.  OM1 proximal 40%.  Zachary circumflex prior to PDA 60-70%.  RCA nondominant, mid 80%.  DA to LAD patent.  SVG to brittany small diagonal branch was patent, sequential SVT to ramus and mid marginal totally occluded.   • H/O echocardiogram 8/14/2019 03/27/2014 - EF 55-60%.  Mild concentric LVH.  Moderately dilated LA.  Mild MI and TI.  Trace AI.   • H/O high risk medication treatment 8/14/2019   • Heart attack     2010   • Hypertension    • Hypomagnesemia 8/14/2019   • Hypothyroidism 8/14/2019   • Kidney failure    • Kidney transplant failure     2009   • Low back pain    • S/P CABG (coronary artery bypass graft) 8/14/2019 01/01/2004 - LIMA to the LAD, SVG to the diagonal branch, and sequential SVG to the intermediui artery and obtuse marginal branch of the circumflex.   • Sciatic pain     RIGHT    • Steatosis of liver 8/14/2019   • Wears hearing aid        Past Surgical History:   Procedure Laterality Date   • CHOLECYSTECTOMY     • CORONARY ARTERY BYPASS GRAFT     • DIALYSIS FISTULA CREATION Left    • EPIDURAL BLOCK     • LUMBAR DISCECTOMY FUSION INSTRUMENTATION Right 10/17/2016    Procedure: Right L4 5 laminectomy and discectomy with Metrix;  Surgeon: Sergio Strange MD;  Location: [facility];  Service:    • TRANSPLANTATION RENAL      2006 2011   • VASECTOMY         Family History: family history includes COPD in his father; Hypertension in his mother. Otherwise pertinent FHx was reviewed and not pertinent to current issue.    Social History:  reports that he has never smoked. He has never used smokeless tobacco. He reports that he does not drink alcohol and does not use drugs.    Home Medications:  Coenzyme Q10, Garlic, HYDROcodone-acetaminophen, Insulin Degludec, Insulin Lispro (1 Unit Dial), Insulin Pen Needle, Vitamin E, amiodarone, aspirin, buPROPion SR, bumetanide, everolimus, ezetimibe, furosemide, glipizide, hydrALAZINE, icosapent ethyl, levothyroxine,  magnesium oxide, metoprolol tartrate, mycophenolate, nitroglycerin, omeprazole, and telmisartan    Allergies:  Allergies   Allergen Reactions   • Contrast Dye (Echo Or Unknown Ct/Mr) Other (See Comments)     KIDNEY TRANSPLANT       Objective   Objective     Vitals:   Temp:  [97.5 °F (36.4 °C)-98.5 °F (36.9 °C)] 97.5 °F (36.4 °C)  Heart Rate:  [84-98] 96  Resp:  [16-18] 18  BP: (128-198)/(59-99) 152/67  Physical Exam     GENERAL: 67 y.o. YO male a/o x 4, NAD  SKIN: Warm pink and dry   HEENT:  PERRL, EOM intact, conjunctivae normal, sclerae clear  NECK: supple, no JVD  LUNGS: Clear to auscultation bilaterally without wheezes, rales or rhonchi.  No accessory muscle use and no nasal flaring.  CARDIAC:  Regular rate and rhythm, S1-S2.  No murmurs, rubs or gallops.  No peripheral edema.  Equal pulses bilaterally.  ABDOMEN: Soft, nontender, nondistended.  No guarding or rebound tenderness.  Normal bowel sounds.  MUSCULOSKELETAL: Moves all extremities well.  No deformity.  BACK: No redness, mild paraspinal tenderness on the right lumbar spine, no induration or fluctuance, positive straight leg raise on the right  NEURO: Cranial nerves II through XII grossly intact.  No gross focal deficits.  Alert.  Normal speech and motor.  PSYCH: Normal mood and affect      Result Review    Result Review:  I have personally reviewed the results from the time of this admission to 4/8/2023 18:21 EDT and agree with these findings:  [x]  Laboratory list / accordion  []  Microbiology  [x]  Radiology  []  EKG/Telemetry   []  Cardiology/Vascular   []  Pathology  [x]  Old records  []  Other:  Most notable findings include: Admission labs pending    CT Lumbar Spine Without Contrast    Result Date: 4/8/2023  1. Bilateral pleural fluid collections are noted larger on the right, only partially visualized. AP and lateral view of the chest is recommended. 2. A hiatal hernia is noted, moderate in size. 3. A right laminotomy is appreciated at L4-L5.  Multilevel degenerative disease involving the lumbar spine is noted as described in detail above with no evidence of disc herniation. This includes multilevel broad-based disc osteophyte complexes and mild-to-moderate foraminal stenosis bilaterally. Moderate degenerative disease involving the sacroiliac joints bilaterally is appreciated. Further evaluation could be performed with a MRI examination of the lumbar spine with and without contrast as indicated.    Radiation dose reduction techniques were utilized, including automated exposure control and exposure modulation based on body size.           Duplex US femoral artery right 4/4/2023  Conclusions: Right groin with no evidence of pseudoaneursym, dissection or arteriovenous fistula. No previous examination available for comparison.     X-ray pelvis with lateral hip right 4/4/2023  IMPRESSION: No evidence of acute injury.     CT abdomen pelvis without contrast 4/4/2023  IMPRESSION: No evidence of retroperitoneal hemorrhage. Moderate sized bilateral pleural effusions with dependent atelectasis. Diverticulosis with no evidence of diverticulitis. Normal left renal transplant within the pelvis. Atrophic right renal transplant. Native renal atrophy bilaterally.      Assessment & Plan   Assessment / Plan     Brief Patient Summary:  Ken Omalley is a 67 y.o. male who is being admitted to observation unit for further evaluation of right low back pain    Active Hospital Problems:  Active Hospital Problems    Diagnosis    • **Right low back pain      Plan:     Right low back pain  -MRI lumbar spine with and without contrast  -Consult neurosurgery  -Consult physical therapy  -Pain control    History of kidney transplant  Immunocompromised state  -Continue immunosuppressants as prescribed  -Pending admission labs, if patient's GFR and creatinine are within acceptable limits, patient will be able to receive IV contrast for MRI lumbar spine    Coronary artery  disease  -Status post heart catheterization 9 days ago  -Continue outpatient follow-up with cardiology    Diabetes  -Insulin dependent   -Accu-Checks 3 times daily with meals  -Moderate-high dose correctional factor with hypoglycemic protocol    Hypothyroidism/hypertension/hyperlipidemia  -Chronic conditions, no new issues  -Continue home medications as prescribed    DVT prophylaxis:  Mechanical DVT prophylaxis orders are present.    CODE STATUS:    Level Of Support Discussed With: Patient  Code Status (Patient has no pulse and is not breathing): CPR (Attempt to Resuscitate)  Medical Interventions (Patient has pulse or is breathing): Full Support    Admission Status:  I believe this patient meets observation status.    77 minutes has been spent by [facility] providers in the care of this patient while under observation status    I wore an N95 mask, face shield, and gloves during this patient encounter. Patient also wearing a surgical mask throughout encounter. Hand hygeine performed before and after seeing the patient.    Electronically signed by ALE Ring, 04/08/23, 6:21 PM EDT.

## 2023-04-08 NOTE — ED NOTES
Ambulated pt with walker; able to get out of room but had to return to bed d/t pain. Pt only able to take little steps. D/t pain on R side of lower back and groin.    Nadeem Gregory is a 52year old male. Patient presents with:  Ear Problem: left ear pain for 3 days      HISTORY OF PRESENT ILLNESS  6/10/15  He again complains of recurrent itchy draining ears HE uses q tips for these every day.   Since his last visit he has Medical History:   Diagnosis Date   • Essential hypertension    • Hyperlipidemia    • Tonsillitis 1977    tonsillectomy     Past Surgical History:  No date: CARPAL TUNNEL RELEASE Right  1977: TONSILLECTOMY      REVIEW OF SYSTEMS    System Neg/Pos Details right ear canal is erythematous and weeping. His left ear canal is almost completely swollen shut I cannot see his tympanic membrane so under the operating microscope I placed a wick followed by Cortisporin Otic suspension.   Patient tolerated the procedur

## 2023-04-08 NOTE — PROGRESS NOTES
MD ATTESTATION NOTE    The [facility] and I have discussed this patient's history, physical exam, and treatment plan.  I have reviewed the documentation and personally had a face to face interaction with the patient. I affirm the documentation and agree with the treatment and plan.  The attached note describes my personal findings.      I provided a substantive portion of the care of the patient.  I personally performed the physical exam in its entirety, and below are my findings.  For this patient encounter, the patient wore surgical mask, I wore full protective PPE including N95 and eye protection.      Brief HPI: Patient with history of kidney transplant and degenerative disc disease presents with low back pain that radiated to his right groin.  Patient also had a recent cardiac catheterization about 9 days ago.  Went to [place] ER on 4/4/2023 had x-rays pelvis ultrasounds femoral artery/pseudoaneurysms these were all negative.  Patient presents with worsening pain.  CT of the lumbar spine showed multilevel degenerative change.    PHYSICAL EXAM  ED Triage Vitals   Temp Heart Rate Resp BP SpO2   04/08/23 1254 04/08/23 1254 04/08/23 1254 04/08/23 1254 04/08/23 1254   98.5 °F (36.9 °C) 84 16 128/59 97 %      Temp src Heart Rate Source Patient Position BP Location FiO2 (%)   04/08/23 1254 04/08/23 1254 04/08/23 1748 04/08/23 1748 --   Oral Monitor Lying Right arm          GENERAL: no acute distress  HENT: nares patent  EYES: no scleral icterus  CV: regular rhythm, normal rate  RESPIRATORY: normal effort  ABDOMEN: soft  MUSCULOSKELETAL: no deformity  NEURO: alert, moves all extremities, follows commands  PSYCH:  calm, cooperative  SKIN: warm, dry    Vital signs and nursing notes reviewed.        Plan: Neurosurgery consult.  MRI pending.

## 2023-04-08 NOTE — ED NOTES
..Nursing report ED to floor  Ken Omalley  67 y.o.  male    HPI :   Chief Complaint   Patient presents with    Groin Pain       Admitting doctor:   Costa GALEANA MD    Admitting diagnosis:   The primary encounter diagnosis was Right lumbar radiculopathy. A diagnosis of Intractable back pain was also pertinent to this visit.    Code status:   Current Code Status       Date Active Code Status Order ID Comments User Context       4/8/2023 1717 CPR (Attempt to Resuscitate) [id number]  Elaina Hall PA ED        Question Answer    Code Status (Patient has no pulse and is not breathing) CPR (Attempt to Resuscitate)    Medical Interventions (Patient has pulse or is breathing) Full Support    Level Of Support Discussed With Patient                    Allergies:   Contrast dye (echo or unknown ct/mr)    Isolation:   No active isolations    Intake and Output  No intake or output data in the 24 hours ending 04/08/23 1720    Weight:       04/08/23  1254   Weight: 105 kg (231 lb)       Most recent vitals:   Vitals:    04/08/23 1500 04/08/23 1620 04/08/23 1621 04/08/23 1701   BP: (!) 198/99 136/72  148/76   Pulse: 98  91 89   Resp:       Temp:       TempSrc:       SpO2: 99%  95% 97%   Weight:       Height:           Active LDAs/IV Access:   Lines, Drains & Airways       Active LDAs       None                    Labs (abnormal labs have a star):   Labs Reviewed   POCT GLUCOSE FINGERSTICK - Abnormal; Notable for the following components:       Result Value    Glucose 186 (*)     All other components within normal limits       EKG:   No orders to display       Meds given in ED:   Medications   lidocaine (LIDODERM) 5 % 1 patch (1 patch Transdermal Medication Applied 4/8/23 4927)   sodium chloride 0.9 % flush 10 mL (has no administration in time range)   sodium chloride 0.9 % flush 10 mL (has no administration in time range)   sodium chloride 0.9 % infusion 40 mL (has no administration in time range)   ondansetron  (ZOFRAN) tablet 4 mg (has no administration in time range)     Or   ondansetron (ZOFRAN) injection 4 mg (has no administration in time range)   nitroglycerin (NITROSTAT) SL tablet 0.4 mg (has no administration in time range)   acetaminophen (TYLENOL) tablet 650 mg (has no administration in time range)   HYDROcodone-acetaminophen (NORCO) 7.5-325 MG per tablet 1 tablet (has no administration in time range)   morphine injection 1 mg (has no administration in time range)     And   naloxone (NARCAN) injection 0.4 mg (has no administration in time range)   dexamethasone sodium phosphate injection 10 mg (10 mg Intramuscular Given 4/8/23 1428)   methocarbamol (ROBAXIN) tablet 750 mg (750 mg Oral Given 4/8/23 1428)   HYDROcodone-acetaminophen (NORCO) 5-325 MG per tablet 1 tablet (1 tablet Oral Given 4/8/23 1624)       Imaging results:  CT Lumbar Spine Without Contrast    Result Date: 4/8/2023  1. Bilateral pleural fluid collections are noted larger on the right, only partially visualized. AP and lateral view of the chest is recommended. 2. A hiatal hernia is noted, moderate in size. 3. A right laminotomy is appreciated at L4-L5. Multilevel degenerative disease involving the lumbar spine is noted as described in detail above with no evidence of disc herniation. This includes multilevel broad-based disc osteophyte complexes and mild-to-moderate foraminal stenosis bilaterally. Moderate degenerative disease involving the sacroiliac joints bilaterally is appreciated. Further evaluation could be performed with a MRI examination of the lumbar spine with and without contrast as indicated.    Radiation dose reduction techniques were utilized, including automated exposure control and exposure modulation based on body size.        Ambulatory status:   - x1 assist with walker     Social issues:   Social History     Socioeconomic History    Marital status:    Tobacco Use    Smoking status: Never    Smokeless tobacco: Never    Substance and Sexual Activity    Alcohol use: No    Drug use: No    Sexual activity: Defer           Alise Reyes RN  04/08/23 17:20 EDT

## 2023-04-08 NOTE — DISCHARGE INSTRUCTIONS
Continued Medrol Dosepak, follow instructions regarding dosing from the packaging.  You have also been prescribed Robaxin 500 mg tablets 4 times daily as needed for muscle spasms.  Continue all other home medication as prescribed.  You will have a follow-up appointment with neurosurgery scheduled by their office.  Follow-up with your primary care doctor in 1 to 2 weeks.    Return to the emergency department with worsening symptoms, uncontrolled pain, inability to tolerate oral liquids, fever greater than 101°F not controlled by Tylenol or as needed with emergent concerns.

## 2023-04-08 NOTE — ED PROVIDER NOTES
"The [facility] and I have discussed this patient's history, physical exam, and treatment plan.  I have reviewed the documentation and personally had a face to face interaction with the patient. I affirm the documentation and agree with the treatment and plan.  The attached note describes my personal findings.      I provided a substantive portion of the care of the patient.  I personally performed the physical exam in its entirety, and below are my findings.     Brief history of present illness: 67-year-old male presents to the ED complaining of low back pain that limit his ability to ambulate.  Symptoms began a little over a week ago and did not resolve with home therapies.  He was seen in another ED on 4/4/2023 and had noncontrast CT abdomen pelvis and ultrasound of the aorta that were unrevealing for acute pathology.  Patient has been using walker since onset of symptoms.  Today he had difficulty getting off the toilet and ultimately felt too weak such that he may fall so he presented to the ED.  Denies abdominal pain, black or bloody stools, incontinence, numbness of the lower extremities.    Physical exam:    BP (!) 198/99   Pulse 98   Temp 98.5 °F (36.9 °C) (Oral)   Resp 16   Ht 175.3 cm (69\")   Wt 105 kg (231 lb)   SpO2 99%   BMI 34.11 kg/m²       Physical Exam   Constitutional: No distress.  Nontoxic  HENT:  Head: Normocephalic and atraumatic.   Oropharynx: Mucous membranes are moist.   Eyes: . No scleral icterus. No conjunctival pallor.  Neck: Normal range of motion. Neck supple.   Cardiovascular: Pink warm and well perfused throughout.    Pulmonary/Chest: No respiratory distress.  No tachypnea or increased work of breathing appreciated.    Abdominal: Soft. There is no tenderness. There is no rebound and no guarding.   Musculoskeletal: Moves all extremities equally.  Mild chronic edema bilateral lower extremities.  Neurological: Alert and oriented.  Sensation grossly intact bilateral lower extremities with " strong plantarflexion and dorsiflexion as well as extension at the hip and knees and flexion at the knees.  Skin: Skin is pink, warm, and dry.   Psychiatric: Mood and affect normal.   Nursing note and vitals reviewed.        MDM: Agree with plan for treatment and imaging.       Darwin Menjivar MD  04/08/23 8779

## 2023-04-09 VITALS
OXYGEN SATURATION: 99 % | HEART RATE: 101 BPM | SYSTOLIC BLOOD PRESSURE: 133 MMHG | TEMPERATURE: 98 F | HEIGHT: 69 IN | WEIGHT: 231 LBS | BODY MASS INDEX: 34.21 KG/M2 | RESPIRATION RATE: 16 BRPM | DIASTOLIC BLOOD PRESSURE: 68 MMHG

## 2023-04-09 LAB
GLUCOSE BLDC GLUCOMTR-MCNC: 259 MG/DL (ref 70–130)
GLUCOSE BLDC GLUCOMTR-MCNC: 345 MG/DL (ref 70–130)

## 2023-04-09 PROCEDURE — 25010000002 DEXAMETHASONE SODIUM PHOSPHATE 20 MG/5ML SOLUTION: Performed by: STUDENT IN AN ORGANIZED HEALTH CARE EDUCATION/TRAINING PROGRAM

## 2023-04-09 PROCEDURE — 99222 1ST HOSP IP/OBS MODERATE 55: CPT | Performed by: NEUROLOGICAL SURGERY

## 2023-04-09 PROCEDURE — 63710000001 INSULIN LISPRO (HUMAN) PER 5 UNITS: Performed by: STUDENT IN AN ORGANIZED HEALTH CARE EDUCATION/TRAINING PROGRAM

## 2023-04-09 PROCEDURE — 97161 PT EVAL LOW COMPLEX 20 MIN: CPT

## 2023-04-09 PROCEDURE — 96374 THER/PROPH/DIAG INJ IV PUSH: CPT

## 2023-04-09 PROCEDURE — G0378 HOSPITAL OBSERVATION PER HR: HCPCS

## 2023-04-09 PROCEDURE — 82962 GLUCOSE BLOOD TEST: CPT

## 2023-04-09 RX ORDER — HYDRALAZINE HYDROCHLORIDE 50 MG/1
50 TABLET, FILM COATED ORAL 3 TIMES DAILY
Status: DISCONTINUED | OUTPATIENT
Start: 2023-04-09 | End: 2023-04-09 | Stop reason: HOSPADM

## 2023-04-09 RX ORDER — BUMETANIDE 2 MG/1
2 TABLET ORAL DAILY
Status: DISCONTINUED | OUTPATIENT
Start: 2023-04-09 | End: 2023-04-09 | Stop reason: HOSPADM

## 2023-04-09 RX ORDER — INSULIN LISPRO 100 [IU]/ML
0-24 INJECTION, SOLUTION INTRAVENOUS; SUBCUTANEOUS
Status: DISCONTINUED | OUTPATIENT
Start: 2023-04-09 | End: 2023-04-09 | Stop reason: HOSPADM

## 2023-04-09 RX ORDER — PANTOPRAZOLE SODIUM 40 MG/1
40 TABLET, DELAYED RELEASE ORAL
Status: DISCONTINUED | OUTPATIENT
Start: 2023-04-09 | End: 2023-04-09 | Stop reason: HOSPADM

## 2023-04-09 RX ORDER — BUPROPION HYDROCHLORIDE 150 MG/1
150 TABLET, EXTENDED RELEASE ORAL DAILY
Status: DISCONTINUED | OUTPATIENT
Start: 2023-04-09 | End: 2023-04-09 | Stop reason: HOSPADM

## 2023-04-09 RX ORDER — METHYLPREDNISOLONE 4 MG/1
TABLET ORAL
Qty: 21 TABLET | Refills: 0
Start: 2023-04-09

## 2023-04-09 RX ORDER — ASPIRIN 81 MG/1
81 TABLET, CHEWABLE ORAL DAILY
Status: DISCONTINUED | OUTPATIENT
Start: 2023-04-09 | End: 2023-04-09 | Stop reason: HOSPADM

## 2023-04-09 RX ORDER — LOSARTAN POTASSIUM 100 MG/1
100 TABLET ORAL
Status: DISCONTINUED | OUTPATIENT
Start: 2023-04-09 | End: 2023-04-09 | Stop reason: HOSPADM

## 2023-04-09 RX ORDER — DEXAMETHASONE SODIUM PHOSPHATE 4 MG/ML
6 INJECTION, SOLUTION INTRA-ARTICULAR; INTRALESIONAL; INTRAMUSCULAR; INTRAVENOUS; SOFT TISSUE EVERY 8 HOURS
Status: DISCONTINUED | OUTPATIENT
Start: 2023-04-09 | End: 2023-04-09 | Stop reason: HOSPADM

## 2023-04-09 RX ORDER — AMIODARONE HYDROCHLORIDE 200 MG/1
200 TABLET ORAL DAILY
Status: DISCONTINUED | OUTPATIENT
Start: 2023-04-09 | End: 2023-04-09 | Stop reason: HOSPADM

## 2023-04-09 RX ORDER — METHOCARBAMOL 500 MG/1
500 TABLET, FILM COATED ORAL 4 TIMES DAILY
Qty: 30 TABLET | Refills: 5 | Status: SHIPPED | OUTPATIENT
Start: 2023-04-09

## 2023-04-09 RX ORDER — LEVOTHYROXINE SODIUM 0.05 MG/1
150 TABLET ORAL DAILY
Status: DISCONTINUED | OUTPATIENT
Start: 2023-04-09 | End: 2023-04-09 | Stop reason: HOSPADM

## 2023-04-09 RX ADMIN — METOPROLOL TARTRATE 100 MG: 25 TABLET, FILM COATED ORAL at 12:17

## 2023-04-09 RX ADMIN — BUPROPION HYDROCHLORIDE 150 MG: 150 TABLET, EXTENDED RELEASE ORAL at 09:21

## 2023-04-09 RX ADMIN — HYDRALAZINE HYDROCHLORIDE 50 MG: 50 TABLET, FILM COATED ORAL at 09:28

## 2023-04-09 RX ADMIN — HYDROCODONE BITARTRATE AND ACETAMINOPHEN 1 TABLET: 7.5; 325 TABLET ORAL at 05:57

## 2023-04-09 RX ADMIN — ASPIRIN 81 MG: 81 TABLET, CHEWABLE ORAL at 09:21

## 2023-04-09 RX ADMIN — LEVOTHYROXINE SODIUM 150 MCG: 0.05 TABLET ORAL at 09:20

## 2023-04-09 RX ADMIN — DEXAMETHASONE SODIUM PHOSPHATE 6 MG: 4 INJECTION, SOLUTION INTRAMUSCULAR; INTRAVENOUS at 09:19

## 2023-04-09 RX ADMIN — INSULIN GLARGINE-YFGN 60 UNITS: 100 INJECTION, SOLUTION SUBCUTANEOUS at 01:31

## 2023-04-09 RX ADMIN — AMIODARONE HYDROCHLORIDE 200 MG: 200 TABLET ORAL at 09:21

## 2023-04-09 RX ADMIN — BUMETANIDE 2 MG: 2 TABLET ORAL at 09:21

## 2023-04-09 RX ADMIN — INSULIN LISPRO 12 UNITS: 100 INJECTION, SOLUTION INTRAVENOUS; SUBCUTANEOUS at 12:21

## 2023-04-09 RX ADMIN — Medication 10 ML: at 09:26

## 2023-04-09 RX ADMIN — INSULIN LISPRO 16 UNITS: 100 INJECTION, SOLUTION INTRAVENOUS; SUBCUTANEOUS at 09:20

## 2023-04-09 RX ADMIN — PANTOPRAZOLE SODIUM 40 MG: 40 TABLET, DELAYED RELEASE ORAL at 05:57

## 2023-04-09 RX ADMIN — METOPROLOL TARTRATE 100 MG: 25 TABLET, FILM COATED ORAL at 01:31

## 2023-04-09 NOTE — THERAPY EVALUATION
Patient Name: Ken Omalley  : 1955    MRN: [id number]                              Today's Date: 2023       Admit Date: 2023    Visit Dx:     ICD-10-CM ICD-9-CM   1. Right lumbar radiculopathy  M54.16 724.4   2. Intractable back pain  M54.9 724.5     Patient Active Problem List   Diagnosis   • Surgery follow-up examination   • Dyslipidemia   • Kidney transplanted   • Hypothyroidism   • Type 2 diabetes mellitus with circulatory disorder, with long-term current use of insulin   • Hypomagnesemia   • Benign essential hypertension   • Coronary arteriosclerosis   • Steatosis of liver   • H/O high risk medication treatment   • H/O echocardiogram   • H/O cardiovascular stress test   • H/O cardiac catheterization   • S/P CABG (coronary artery bypass graft)   • Hypertension with hypervolemia   • Obstructive sleep apnea on CPAP   • Right low back pain     Past Medical History:   Diagnosis Date   • Benign essential hypertension 2019   • Chronic kidney disease    • Coronary arteriosclerosis 2019    H/o CABG   • Depression    • Diabetes mellitus    • Disease of thyroid gland    • GERD (gastroesophageal reflux disease)    • H/O cardiac catheterization 2010 - Left main 50%.  LAD mid 100%.  LCX 50%, proximal 50%, small ramus branch mid 60%.  OM1 proximal 40%.  Zachary circumflex prior to PDA 60-70%.  RCA nondominant, mid 80%.  DA to LAD patent.  SVG to brittany small diagonal branch was patent, sequential SVT to ramus and mid marginal totally occluded.   • H/O echocardiogram 2014 - EF 55-60%.  Mild concentric LVH.  Moderately dilated LA.  Mild MI and TI.  Trace AI.   • H/O high risk medication treatment 2019   • Heart attack        • Hypertension    • Hypomagnesemia 2019   • Hypothyroidism 2019   • Kidney failure    • Kidney transplant failure        • Low back pain    • S/P CABG (coronary artery bypass graft) 2004 - LIMA to the  LAD, SVG to the diagonal branch, and sequential SVG to the intermediui artery and obtuse marginal branch of the circumflex.   • Sciatic pain     RIGHT    • Steatosis of liver 8/14/2019   • Wears hearing aid      Past Surgical History:   Procedure Laterality Date   • CHOLECYSTECTOMY     • CORONARY ARTERY BYPASS GRAFT     • DIALYSIS FISTULA CREATION Left    • EPIDURAL BLOCK     • LUMBAR DISCECTOMY FUSION INSTRUMENTATION Right 10/17/2016    Procedure: Right L4 5 laminectomy and discectomy with Metrix;  Surgeon: Sergio Strange MD;  Location: [facility];  Service:    • TRANSPLANTATION RENAL      2006 2011   • VASECTOMY        General Information     Row Name 04/09/23 1428          Physical Therapy Time and Intention    Document Type evaluation  -     Mode of Treatment physical therapy  -PC     Row Name 04/09/23 1428          General Information    Patient Profile Reviewed yes  -PC     Prior Level of Function independent:  -PC     Row Name 04/09/23 1428          Living Environment    People in Home spouse  -PC     Row Name 04/09/23 1428          Home Main Entrance    Number of Stairs, Main Entrance three  -PC     Stair Railings, Main Entrance railings safe and in good condition  -PC     Row Name 04/09/23 1428          Stairs Within Home, Primary    Number of Stairs, Within Home, Primary none  -PC     Row Name 04/09/23 1428          Cognition    Orientation Status (Cognition) oriented x 4  -PC     Row Name 04/09/23 1428          Safety Issues, Functional Mobility    Impairments Affecting Function (Mobility) pain  -PC           User Key  (r) = Recorded By, (t) = Taken By, (c) = Cosigned By    Initials Name Provider Type    PC Lorrie De La Rosa PT Physical Therapist               Mobility     Row Name 04/09/23 1428          Bed Mobility    Comment, (Bed Mobility) up in chair upon arrival  -PC     Row Name 04/09/23 1428          Sit-Stand Transfer    Sit-Stand [place] (Transfers) independent  -PC     Row Name  04/09/23 1428          Gait/Stairs (Locomotion)    [place] Level (Gait) independent  -PC     Assistive Device (Gait) walker, front-wheeled  -PC     Distance in Feet (Gait) 150 ft  -PC     Deviations/Abnormal Patterns (Gait) antalgic  -PC     Comment, (Gait/Stairs) mild antalgic gait RLE  -PC           User Key  (r) = Recorded By, (t) = Taken By, (c) = Cosigned By    Initials Name Provider Type    PC Lorrie De La Rosa PT Physical Therapist               Obj/Interventions     Row Name 04/09/23 1429          Range of Motion Comprehensive    General Range of Motion no range of motion deficits identified  -PC     Row Name 04/09/23 1429          Strength Comprehensive (MMT)    General Manual Muscle Testing (MMT) Assessment no strength deficits identified  -PC     Row Name 04/09/23 1429          Balance    Balance Assessment sitting static balance;sitting dynamic balance;standing static balance;standing dynamic balance  -PC     Static Sitting Balance independent  -PC     Dynamic Sitting Balance independent  -PC     Static Standing Balance independent  -PC     Dynamic Standing Balance independent  -PC           User Key  (r) = Recorded By, (t) = Taken By, (c) = Cosigned By    Initials Name Provider Type    PC Lorrie De La Rosa, PT Physical Therapist               Goals/Plan    No documentation.                Clinical Impression     Row Name 04/09/23 1430          Pain    Pretreatment Pain Rating 1/10  -PC     Pain Location - Side/Orientation Right  -PC     Pain Location lower  -PC     Pain Location - back  -PC     Pre/Posttreatment Pain Comment pt describes pain in low back which radiates around to R groin, not currently hurting, but hurts with certain movements  -PC     Row Name 04/09/23 1430          Plan of Care Review    Plan of Care Reviewed With patient  -PC     Outcome Evaluation Pt adm with R low back pain radiating to his groin, pt with hx of back surgery a few years ago, spinal work up negative, pt is s/p heart  cath with R groin access 9 days ago, access site looks good. Pt states that the pain is not constant and he is feeling a little better, he lives with his wife, 3 steps to enter with a handrail, level once inside and has a walker if needed, was not using one previously. Pt was able to walk 150 ft with rwx with stand by assist, and he is safe to return home, MD mora PT, so pt can be referred to outpt PT if needed  -PC     Row Name 04/09/23 1430          Therapy Assessment/Plan (PT)    Criteria for Skilled Interventions Met (PT) no problems identified which require skilled intervention  -PC     Therapy Frequency (PT) evaluation only  -PC     Row Name 04/09/23 1430          Positioning and Restraints    Pre-Treatment Position sitting in chair/recliner  -PC     Post Treatment Position chair  -PC     In Chair call light within reach;sitting;encouraged to call for assist  -PC           User Key  (r) = Recorded By, (t) = Taken By, (c) = Cosigned By    Initials Name Provider Type    PC Lorrie De La Rosa, PT Physical Therapist               Outcome Measures     Row Name 04/09/23 1437          How much help from another person do you currently need...    Turning from your back to your side while in flat bed without using bedrails? 4  -PC     Moving from lying on back to sitting on the side of a flat bed without bedrails? 4  -PC     Moving to and from a bed to a chair (including a wheelchair)? 4  -PC     Standing up from a chair using your arms (e.g., wheelchair, bedside chair)? 4  -PC     Climbing 3-5 steps with a railing? 4  -PC     To walk in hospital room? 4  -PC     AM-PAC 6 Clicks Score (PT) 24  -PC     Highest level of mobility 8 --> Walked 250 feet or more  -PC     Row Name 04/09/23 1437          Functional Assessment    Outcome Measure Options AM-PAC 6 Clicks Basic Mobility (PT)  -PC           User Key  (r) = Recorded By, (t) = Taken By, (c) = Cosigned By    Initials Name Provider Type    PC Lorrie De La Rosa, PT Physical  Therapist                               PT Recommendation and Plan     Plan of Care Reviewed With: patient  Outcome Evaluation: Pt adm with R low back pain radiating to his groin, pt with hx of back surgery a few years ago, spinal work up negative, pt is s/p heart cath with R groin access 9 days ago, access site looks good. Pt states that the pain is not constant and he is feeling a little better, he lives with his wife, 3 steps to enter with a handrail, level once inside and has a walker if needed, was not using one previously. Pt was able to walk 150 ft with rwx with stand by assist, and he is safe to return home, MD rec PT, so pt can be referred to outpt PT if needed     Time Calculation:    PT Charges     Row Name 04/09/23 1438             Time Calculation    Start Time 1340  -PC      Stop Time 1355  -PC      Time Calculation (min) 15 min  -PC      PT Received On 04/09/23  -PC            User Key  (r) = Recorded By, (t) = Taken By, (c) = Cosigned By    Initials Name Provider Type    PC Lorrie De La Rosa PT Physical Therapist              Therapy Charges for Today     Code Description Service Date Service Provider Modifiers Qty    [id number]  PT EVAL LOW COMPLEXITY 2 4/9/2023 Lorrie De La Rosa PT GP 1          PT G-Codes  Outcome Measure Options: AM-PAC 6 Clicks Basic Mobility (PT)  AM-PAC 6 Clicks Score (PT): 24  PT Discharge Summary  Anticipated Discharge Disposition (PT): home with assist    Lorrie De La Rosa, OTTO  4/9/2023

## 2023-04-09 NOTE — CONSULTS
NEUROSURGERY CONSULT      Ken Omalley  1955  [id number]    Referring Provider: Costa Ordonez MD  [address] [place] [address]  [place] [address]  Reason for Consultation: Right hip pain, previous disc surgery    Patient Care Team:  Delfina Whipple MD as PCP - General (Internal Medicine)  Edmundo Conner MD as Consulting Physician (Cardiology)  Jeanne Castañeda MD as Consulting Physician (Nephrology)    Chief Complaint: Right hip pain    Subjective .     History of Present Illness: Declan Oscar is a 67-year-old  male who has had previous right L5-S1 lumbar microscopic discectomy by Dr. Strange in 2016.  He did well following the surgery.  Patient had a heart catheterization done 9 days ago and following the procedure he developed severe right groin pain with hip pain.  He presented to [facility] on April 4 and had x-rays of his pelvis ultrasound of his femoral artery to rule out pseudoaneurysm, and CT of the abdomen pelvis which were reportedly unrevealing.  Patient was discharged home with a walker and pain medications and told to start steroids.  He did get a little bit better and did not start the steroids until yesterday but after taking the first dose of steroids the pain became intolerable to where he could not ambulate and presented to the ER.  An MRI of the lumbar spine.  He denies any distal lower extremity pain beyond the hip and groin as well as no weakness or numbness in the right lower extremity    While in the room and during my examination of the patient I wore a mask.  I washed my hands before and after this patient encounter.  The patient was not wearing a mask.    Review of Systems  Review of Systems   Constitutional: Positive for activity change. Negative for fever.   Musculoskeletal: Positive for back pain.        Right hip pain   Neurological: Negative for weakness, numbness and headaches.   All other systems reviewed and are negative.      History  Past  Medical History:   Diagnosis Date   • Benign essential hypertension 8/14/2019   • Chronic kidney disease    • Coronary arteriosclerosis 8/14/2019    H/o CABG   • Depression    • Diabetes mellitus    • Disease of thyroid gland    • GERD (gastroesophageal reflux disease)    • H/O cardiac catheterization 8/14/2019 11/28/2010 - Left main 50%.  LAD mid 100%.  LCX 50%, proximal 50%, small ramus branch mid 60%.  OM1 proximal 40%.  Zachary circumflex prior to PDA 60-70%.  RCA nondominant, mid 80%.  DA to LAD patent.  SVG to brittany small diagonal branch was patent, sequential SVT to ramus and mid marginal totally occluded.   • H/O echocardiogram 8/14/2019 03/27/2014 - EF 55-60%.  Mild concentric LVH.  Moderately dilated LA.  Mild MI and TI.  Trace AI.   • H/O high risk medication treatment 8/14/2019   • Heart attack     2010   • Hypertension    • Hypomagnesemia 8/14/2019   • Hypothyroidism 8/14/2019   • Kidney failure    • Kidney transplant failure     2009   • Low back pain    • S/P CABG (coronary artery bypass graft) 8/14/2019 01/01/2004 - LIMA to the LAD, SVG to the diagonal branch, and sequential SVG to the intermediui artery and obtuse marginal branch of the circumflex.   • Sciatic pain     RIGHT    • Steatosis of liver 8/14/2019   • Wears hearing aid    ,   Past Surgical History:   Procedure Laterality Date   • CHOLECYSTECTOMY     • CORONARY ARTERY BYPASS GRAFT     • DIALYSIS FISTULA CREATION Left    • EPIDURAL BLOCK     • LUMBAR DISCECTOMY FUSION INSTRUMENTATION Right 10/17/2016    Procedure: Right L4 5 laminectomy and discectomy with Metrix;  Surgeon: Sergio Strange MD;  Location: [facility];  Service:    • TRANSPLANTATION RENAL      2006 2011   • VASECTOMY     ,   Family History   Problem Relation Age of Onset   • Hypertension Mother    • COPD Father    ,   Social History     Socioeconomic History   • Marital status:    Tobacco Use   • Smoking status: Never     Passive exposure: Never   • Smokeless  tobacco: Never   Vaping Use   • Vaping Use: Never used   Substance and Sexual Activity   • Alcohol use: No   • Drug use: No   • Sexual activity: Defer     E-cigarette/Vaping   • E-cigarette/Vaping Use Never User      E-cigarette/Vaping Substances   • Nicotine No    • THC No    • CBD No    • Flavoring No      E-cigarette/Vaping Devices   • Disposable No    • Pre-filled or Refillable Cartridge No    • Refillable Tank No    • Pre-filled Pod No        ,   Medications Prior to Admission   Medication Sig Dispense Refill Last Dose   • amiodarone (PACERONE) 200 MG tablet Take 1 tablet by mouth Daily.   4/7/2023   • aspirin 81 MG chewable tablet Chew 1 tablet Daily.   4/7/2023   • bumetanide (BUMEX) 2 MG tablet Take 1 tablet by mouth Daily.   4/7/2023 at 0900   • buPROPion SR (WELLBUTRIN SR) 150 MG 12 hr tablet Take 1 tablet by mouth Daily.   4/7/2023 at 0800   • Coenzyme Q10 200 MG capsule Take 200 mg by mouth Daily.   4/7/2023 at 0900   • everolimus (ZORTRESS) 0.75 MG tablet Take 3 tablets by mouth 2 (Two) Times a Day.   4/8/2023 at 0900   • ezetimibe (ZETIA) 10 MG tablet Take 1 tablet by mouth Daily.   4/7/2023 at 2100   • GARLIC PO Take  by mouth.   4/7/2023 at 0900   • glipiZIDE (GLUCOTROL) 10 MG tablet Take 1 tablet by mouth Daily.   4/8/2023 at 0900   • HUMALOG KWIKPEN 100 UNIT/ML solution pen-injector Take As Directed. SLIDING SCALE   4/7/2023   • hydrALAZINE (APRESOLINE) 100 MG tablet Take 0.5 tablets by mouth 3 (Three) Times a Day. 135 tablet 3 4/7/2023 at 2200   • HYDROcodone-acetaminophen (NORCO) 5-325 MG per tablet Take 1 tablet by mouth Every 6 (Six) Hours As Needed.   4/8/2023 at 0900   • Insulin Degludec (TRESIBA FLEXTOUCH) 200 UNIT/ML solution pen-injector pen injection INJECT 60 UNITS INTO THE SKIN NIGHTLY   4/7/2023 at 2100   • levothyroxine (SYNTHROID, LEVOTHROID) 150 MCG tablet Take 1 tablet by mouth Daily.   4/7/2023 at 0600   • magnesium oxide (MAG-OX) 400 MG tablet Take 2 tablets by mouth 3 (Three)  Times a Day.   4/7/2023 at 2100   • metoprolol tartrate (LOPRESSOR) 100 MG tablet Take 1 tablet by mouth 2 (Two) Times a Day.   4/7/2023 at 2100   • mycophenolate (MYFORTIC) 360 MG tablet delayed-release EC tablet Take 2 tablets by mouth Every 12 (Twelve) Hours.   4/8/2023 at 0900   • omeprazole (PriLOSEC) 40 MG capsule Take 1 capsule by mouth Daily.   4/7/2023 at 0900   • telmisartan (MICARDIS) 80 MG tablet Take 1 tablet by mouth Daily.   4/7/2023 at 2100   • Vitamin E 400 UNITS tablet Take 1,200 Units by mouth Daily.   4/7/2023 at 0900   • BD PEN NEEDLE GUTIERREZ U/F 32G X 4 MM misc       • furosemide (LASIX) 40 MG tablet TAKE 1 TABLET DAILY 90 tablet 1    • nitroglycerin (NITROSTAT) 0.4 MG SL tablet Place 1 tablet under the tongue.   More than a month   , Scheduled Meds:  amiodarone, 200 mg, Oral, Daily  aspirin, 81 mg, Oral, Daily  bumetanide, 2 mg, Oral, Daily  buPROPion SR, 150 mg, Oral, Daily  dexamethasone, 6 mg, Intravenous, Q8H  hydrALAZINE, 50 mg, Oral, TID  insulin glargine, 60 Units, Subcutaneous, Nightly  insulin lispro, 0-24 Units, Subcutaneous, 4x Daily With Meals & Nightly  levothyroxine, 150 mcg, Oral, Daily  losartan, 100 mg, Oral, Q24H  metoprolol tartrate, 100 mg, Oral, Q12H  pantoprazole, 40 mg, Oral, Q AM  sodium chloride, 10 mL, Intravenous, Q12H    , Continuous Infusions:  lactated ringers, 50 mL/hr, Last Rate: 50 mL/hr (04/08/23 4181)    , PRN Meds:  •  acetaminophen  •  dextrose  •  dextrose  •  glucagon (human recombinant)  •  HYDROcodone-acetaminophen  •  Morphine **AND** naloxone  •  nitroglycerin  •  ondansetron **OR** ondansetron  •  sodium chloride  •  sodium chloride and Allergies:  Contrast dye (echo or unknown ct/mr)    Tobacco Use: Low Risk    • Smoking Tobacco Use: Never   • Smokeless Tobacco Use: Never   • Passive Exposure: Never        Objective     Vital Signs   Temp:  [97.5 °F (36.4 °C)-98.5 °F (36.9 °C)] 97.8 °F (36.6 °C)  Heart Rate:  [] 98  Resp:  [16-18] 16  BP:  (112-198)/(59-99) 131/66  Body mass index is 34.11 kg/m².    Physical Exam    CONSTITUTIONAL: This 67year old   male appears well developed, well-nourished and in no acute distress sitting up in the chair at the bedside comfortably.  States that when he stands he gets right hip and groin pain.    HEAD & FACE: the head and face are symmetric, normocephalic and atraumatic.    EYES: Inspection of the conjunctivae and lids reveals no swelling, erythema or discharge.  Pupils are round, equal and reactive to light and there is no scleral icterus.    EARS, NOSE, MOUTH & THROAT: On inspection, the ears, nose and oral cavity are within normal limits.    NECK: The neck is supple and symmetric. The trachea is midline with no masses.    PULMONARY: Respiratory effort is normal with no increased work of breathing or signs of respiratory distress.    CARDIOVASCULAR: Pulses present in both lower extremities no hematoma at the groin. Examination of the extremities shows no edema or varicosities.    MUSCULOSKELETAL: . The spine has normal alignment and range of motion,    SKIN: The skin is warm, dry and intact, lumbar spine incision healed    NEUROLOGIC:    Cranial Nerves 2 through 12  Normal motor strength noted. Muscle bulk and tone are normal.  Sensory exam is normal to fine touch to confrontational testing bilaterally.  Reflexes on the right side demonstrates 1/4 Knee Jerk Reflex, 1/4 Ankle Jerk Reflex and no ankle clonus on the right.   Reflexes on the left side demonstrates 1/4 Knee Jerk Reflex, 1/4 Ankle Jerk Reflex and no ankle clonus on the left.  Superficial/Primitive Reflexes: primitive reflexes were absent.  No coordination deficit observed.  Cortical function is intact and without deficits. Speech is normal.    PSYCHIATRIC: Oriented to person, place and time. Patient's mood and affect are normal.      Results Review:   I reviewed the patient's new clinical results.    Images:    I personally reviewed the images  from the following radiographic studies.    MRI [facility] the lumbar spine done at [facility] revealsA right laminotomy is noted at L4-L5 no evidence  of disc herniation. Multilevel degenerative disease and mild lumbar spine is noted including moderate foraminal stenosis to the right at L4-L5 secondary to loss of disc height and extension of a disc  osteophyte complex into the neural foramen and mild-to-moderate canal stenosis at L3-L4 secondary to a broad-based disc osteophyte complex.    Lab Results (last 24 hours)     Procedure Component Value Units Date/Time    POC Glucose Once [[id number]]  (Abnormal) Collected: 04/08/23 1431    Specimen: Blood Updated: 04/08/23 1432     Glucose 186 mg/dL      Comment: Meter: [id number] : [id number] [facility]       CBC & Differential [[id number]]  (Abnormal) Collected: 04/08/23 2132    Specimen: Blood from Arm, Right Updated: 04/08/23 2146    Narrative:      The following orders were created for panel order CBC & Differential.  Procedure                               Abnormality         Status                     ---------                               -----------         ------                     CBC Auto Differential[[id number]]        Abnormal            Final result                 Please view results for these tests on the individual orders.    Comprehensive Metabolic Panel [[id number]]  (Abnormal) Collected: 04/08/23 2132    Specimen: Blood from Arm, Right Updated: 04/08/23 2156     Glucose 357 mg/dL      BUN 15 mg/dL      Creatinine 1.12 mg/dL      Sodium 131 mmol/L      Potassium 5.1 mmol/L      Chloride 96 mmol/L      CO2 20.0 mmol/L      Calcium 9.1 mg/dL      Total Protein 6.6 g/dL      Albumin 3.5 g/dL      ALT (SGPT) 19 U/L      AST (SGOT) 26 U/L      Alkaline Phosphatase 67 U/L      Total Bilirubin 0.6 mg/dL      Globulin 3.1 gm/dL      A/G Ratio 1.1 g/dL      BUN/Creatinine Ratio 13.4     Anion Gap 15.0 mmol/L      eGFR 72.9 mL/min/1.73      Narrative:      GFR Normal >60  Chronic Kidney Disease <60  Kidney Failure <15      CBC Auto Differential [[id number]]  (Abnormal) Collected: 04/08/23 2132    Specimen: Blood from Arm, Right Updated: 04/08/23 2146     WBC 4.01 10*3/mm3      RBC 5.18 10*6/mm3      Hemoglobin 14.1 g/dL      Hematocrit 43.1 %      MCV 83.2 fL      MCH 27.2 pg      MCHC 32.7 g/dL      RDW 14.6 %      RDW-SD 44.8 fl      MPV 9.6 fL      Platelets 210 10*3/mm3      Neutrophil % 92.4 %      Lymphocyte % 6.7 %      Monocyte % 0.7 %      Eosinophil % 0.0 %      Basophil % 0.0 %      Immature Grans % 0.2 %      Neutrophils, Absolute 3.70 10*3/mm3      Lymphocytes, Absolute 0.27 10*3/mm3      Monocytes, Absolute 0.03 10*3/mm3      Eosinophils, Absolute 0.00 10*3/mm3      Basophils, Absolute 0.00 10*3/mm3      Immature Grans, Absolute 0.01 10*3/mm3      nRBC 0.0 /100 WBC     POC Glucose Once [[id number]]  (Abnormal) Collected: 04/09/23 0721    Specimen: Blood Updated: 04/09/23 0724     Glucose 345 mg/dL      Comment: Meter: [id number] : [id number] Lauryn Duffy PCA             Assessment & Plan       Right low back pain      Patient has right back hip and groin pain shortly following a heart catheter procedure 9 days ago.  He does not have any overt radicular signs that would correlate with his previous level of surgery at L4-5 on the right although he does have some lateral recess narrowing at that level.  His degree of stenosis at L3-4 is moderate, symmetrical and not severe.  He does not have a radiculopathy in the right lower extremity to suggest that neurosurgical intervention would be needed.  Suggest continuing the steroids that was previously recommended and get physical therapy to see how he tolerates today.  We can reevaluate tomorrow to see how he proceeds.  I suppose there is a concern that this could be some aspect of femoral nerve injury from the heart catheter which should heal with time.    I discussed the patient's findings  and my recommendations with patient and primary care team    Costa Hester MD  04/09/23  10:09 EDT

## 2023-04-09 NOTE — DISCHARGE SUMMARY
ED OBSERVATION PROGRESS/DISCHARGE SUMMARY    Date of Admission: 4/8/2023   LOS: 0 days   PCP: Delfina Whipple MD    Subjective    Patient states his back pain has significantly improved.  He states he was able to ambulate without much difficulty.  He states he will still have intermittent sharp pains that he feels down the right leg at time but they only last a few seconds before resolving.  He denies any numbness and tingling.  Denies abdominal pain and nausea.  Denies chest pain and shortness of breath.  Patient states his pain is much improved and he would like to go home at this time.    Hospital Outcome:  67-year-old male was seen and examined at bedside for admission to observation unit due to right low back pain that radiated into right groin.  Initial evaluation in the ED included CT L-spine that shows multilevel degenerative disease.  MRI lumbar spine with and without shows right laminotomy at L4-L5 with mild to moderate canal stenosis at L3 and L4 secondary to a broad disc osteophyte complex.      Neurosurgery saw and evaluated the patient recommended to continue steroids and get evaluation with physical therapy with tentative plan to reevaluate tomorrow if patient does not improve.  Patient's symptoms improved significantly and he was able to dissipate with physical therapy where he was using his walker independently without  ROS:  General: no fevers, chills  Respiratory: no cough, dyspnea  Cardiovascular: no chest pain, palpitations  Abdomen: No abdominal pain, nausea, vomiting, or diarrhea  Neurologic: No focal weakness    Objective   Physical Exam:  I have reviewed the vital signs.  Temp:  [97.5 °F (36.4 °C)-98.5 °F (36.9 °C)] 97.5 °F (36.4 °C)  Heart Rate:  [] 109  Resp:  [16-18] 18  BP: (112-198)/(59-99) 112/70  General Appearance:  67-year-old male, well-nourished, no acute distress on room air  Head:    Normocephalic, atraumatic  Eyes:    Sclerae anicteric  Neck:   Supple, no  mass  Lungs: Clear to auscultation bilaterally, respirations unlabored  Heart: Regular rate and rhythm, S1 and S2 normal, no murmur, rub or gallop  Abdomen:  Soft, non-tender, bowel sounds active, nondistended  Extremities: No clubbing, cyanosis, or edema to lower extremities, normal gait  Pulses:  2+ and symmetric in distal lower extremities  Skin: No rashes   Neurologic: Oriented x3, Normal strength to extremities    Results Review:    I have reviewed the labs, radiology results and diagnostic studies.    Results from last 7 days   Lab Units 04/08/23  2132   WBC 10*3/mm3 4.01   HEMOGLOBIN g/dL 14.1   HEMATOCRIT % 43.1   PLATELETS 10*3/mm3 210     Results from last 7 days   Lab Units 04/08/23  2132   SODIUM mmol/L 131*   POTASSIUM mmol/L 5.1   CHLORIDE mmol/L 96*   CO2 mmol/L 20.0*   BUN mg/dL 15   CREATININE mg/dL 1.12   CALCIUM mg/dL 9.1   BILIRUBIN mg/dL 0.6   ALK PHOS U/L 67   ALT (SGPT) U/L 19   AST (SGOT) U/L 26   GLUCOSE mg/dL 357*     Imaging Results (Last 24 Hours)     Procedure Component Value Units Date/Time    MRI Lumbar Spine With & Without Contrast [[id number]] Collected: 04/08/23 2054     Updated: 04/08/23 2236    Narrative:      MRI EXAMINATION OF THE LUMBAR SPINE WITH AND WITHOUT CONTRAST     HISTORY: Low back pain.     COMPARISON: CT lumbar spine 04/08/2023.     FINDINGS: There is moderate loss of disc height at L4-L5. The alignment  of the lumbar spine is within normal limits. The conus is at T12-L1 and  the caudal aspect of the spinal cord appears unremarkable.     L1-L2: There is a minimal central disc bulge.     L2-L3: There is mild canal stenosis secondary to a broad-based disc  osteophyte complex which also contributes to mild foraminal stenosis  bilaterally.     L3-L4: There is mild-to-moderate canal stenosis and mild lateral recess  narrowing bilaterally secondary to a broad-based disc osteophyte complex  which also contributes to mild foraminal stenosis bilaterally. Mild  facet  degenerative disease is present bilaterally.     L4-L5: A right laminotomy is noted. A mild broad-based disc osteophyte  complex is present which results in mild flattening of ventral surface  of the thecal sac. There is moderate foraminal stenosis on the right and  mild-to-moderate foraminal stenosis on the left secondary to loss of  disc height and extension of a disc osteophyte complex into the neural  foramen.     L5-S1: Mild facet degenerative disease is present bilaterally. A  broad-based disc osteophyte complex is present resulting in mild  flattening of the ventral surface of the thecal sac. Mild foraminal  stenosis is present bilaterally secondary to extension of disc material  into the neural foramen.     After contrast administration, there was no evidence of abnormal  enhancement.       Impression:      A right laminotomy is noted at L4-L5. There is no evidence  of disc herniation. Multilevel degenerative disease and mild lumbar  spine is noted including moderate foraminal stenosis to the right at  L4-L5 secondary to loss of disc height and extension of a disc  osteophyte complex into the neural foramen and mild-to-moderate canal  stenosis at L3-L4 secondary to a broad-based disc osteophyte complex.  See above.     This report was finalized on 4/8/2023 10:33 PM by Dr. Servando Pena M.D.       CT Lumbar Spine Without Contrast [[id number]] Collected: 04/08/23 1606     Updated: 04/08/23 2224    Narrative:      CT LUMBAR SPINE WITHOUT CONTRAST     HISTORY: Back pain, right radiculopathy, previous lumbar surgery.     COMPARISON: None.     FINDINGS: Bilateral pleural fluid collections are appreciated, only  partially visualized, larger on the right and moderate on the left. The  kidneys are atrophic bilaterally. Moderate-to-severe vascular  calcification involving the abdominal aorta and common iliac arteries  bilaterally is appreciated.     There is moderate-to-severe loss of disc height at L4-L5, more  prominent  on the right.     Anterior bridging osteophyte is appreciated at T11-T12.     T12-L1: There is no evidence of disc bulge or herniation.     L1-L2: There is no evidence of disc bulge or herniation.     L2-L3: There is mild canal stenosis secondary to a broad-based disc  osteophyte complex. The disc osteophyte complex contributes to mild  lateral recess narrowing and foraminal stenosis bilaterally.     L3-L4: There is mild-to-moderate central canal stenosis secondary to a  central and left paramedian disc osteophyte complex which also extends  into the neural foramen bilaterally contributing to mild foraminal  stenosis.     L4-L5: A right laminotomy is noted. A broad-based disc osteophyte  complex is present which results in mild canal stenosis. Moderate  foraminal stenosis is present on the right and there is mild-to-moderate  foraminal stenosis on the left secondary to loss of disc height and  extension of a disc osteophyte complex into the neural foramen.     L5-S1: A mild central disc osteophyte complex is present with no  evidence of herniation. Moderate-to-severe degenerative disease  involving the sacroiliac joints is appreciated bilaterally with fusion  of the SI joints anteriorly bilaterally noted. A central broad-based  disc osteophyte complex is present which abuts but does not appear to  displace the traversing S1 nerve roots. Mild foraminal stenosis is  present bilaterally secondary to extension of a small disc osteophyte  complex into the neural foramen.       Impression:      1. Bilateral pleural fluid collections are noted larger on the right,  only partially visualized. A PA and lateral view of the chest is  recommended.  2. A hiatal hernia is noted, moderate in size.  3. A right laminotomy is appreciated at L4-L5. Multilevel degenerative  disease involving the lumbar spine is noted as described in detail above  with no evidence of disc herniation. This includes multilevel  broad-based disc  osteophyte complexes and mild-to-moderate foraminal  stenosis bilaterally. Moderate degenerative disease involving the  sacroiliac joints bilaterally is appreciated. Further evaluation could  be performed with a MRI examination of the lumbar spine with and without  contrast as indicated.           Radiation dose reduction techniques were utilized, including automated  exposure control and exposure modulation based on body size.     This report was finalized on 4/8/2023 10:21 PM by Dr. Servando Pena M.D.       XR Chest PA & Lateral [[id number]] Collected: 04/08/23 2114     Updated: 04/08/23 2146    Narrative:      PA AND LATERAL CHEST      HISTORY: Pleural fluid.     COMPARISON: No prior chest x-ray is available for comparison.     FINDINGS: PA and lateral views of the chest demonstrate the heart to be  within normal limits in size. Bilateral pleural fluid collections are  noted larger on the right. There is right lower lobe  atelectasis/infiltrate. There is no evidence of congestive failure.     This report was finalized on 4/8/2023 9:43 PM by Dr. Servando Pena M.D.             I have reviewed the medications.  ---------------------------------------------------------------------------------------------  Assessment & Plan   Assessment/Problem List    Right low back pain      Plan:    Right low back pain  -MRI lumbar spine with and without contrast shows right laminotomy at L4-L5 with mild to moderate canal stenosis at L3 and L4 secondary to a broad disc osteophyte complex.  -Consult neurosurgery, continue steroids and will reevaluate tomorrow if patient not improved.  Otherwise will follow-up on an outpatient basis  -PT saw and evaluated the patient recommending referral to outpatient physical therapy  -Patient was already started on Medrol Dosepak will continue at discharge, also provided prescription for Robaxin as needed     History of kidney transplant  Immunocompromised state  -Continue immunosuppressants as  prescribed    Coronary artery disease  -Status post heart catheterization 9 days ago  -Continue outpatient follow-up with cardiology     Diabetes  -Insulin dependent   -Accu-Checks 3 times daily with meals  -Moderate-high dose correctional factor with hypoglycemic protocol     Hypothyroidism/hypertension/hyperlipidemia  -Chronic conditions, no new issues  -Continue home medications as prescribed    Disposition: Home    Follow-up after Discharge: Neurosurgery, PCP    55 minutes have been spent by [facility] providers in the care of this patient while under observation status.      I wore an face mask, eye protection, and gloves during this patient encounter. Patient also wearing a surgical mask. Hand hygeine performed before and after seeing the patient.      Debbie Cooper PA-C 04/09/23 15:35 EDT

## 2023-04-09 NOTE — PROGRESS NOTES
Pt presents to the ED c/o  right lower back pain rating to the right groin and upper leg.  Recently started on steroids for possible sciatica after having work-up for possible post heart cath complications but recent work-up for that was unremarkable, including negative arterial ultrasound of the right lower extremity which was done to eval for any evidence of pseudoaneurysm.  CT abdomen pelvis was also negative at that time.  Patient has positional and movement related pain in the right upper leg.     On exam,   General: No acute distress, nontoxic  HEENT: Mucous membranes moist, atraumatic, EOMI  Neck: Full ROM  Pulm: Symmetric chest rise, nonlabored, lungs CTAB  Cardiovascular: Regular rate and rhythm, intact distal pulses  MSK: Full ROM, no deformity  Skin: Warm, dry  Neuro: Awake, alert, oriented x 4, GCS 15, moving all extremities, no focal deficits  Psych: Calm, cooperative      N95, protective eye goggles, and gloves used during this encounter. Patient in surgical mask.      Plan: MRI of the lumbar spine shows no emergent process, chronic changes noted.  Neurosurgery has evaluated with plans to keep overnight for further symptom control and physical therapy, and initiation of Dexamethasone, and reevaluation in the AM for possible injection.        Attestation:  The [facility] and I have discussed this patient's history, physical exam, and treatment plan.  I have reviewed the documentation and personally had a face to face interaction with the patient. I affirm the documentation and agree with the treatment and plan.  The attached note describes my personal findings.

## 2023-04-09 NOTE — PLAN OF CARE
Goal Outcome Evaluation:  Plan of Care Reviewed With: patient           Outcome Evaluation: Pt adm with R low back pain radiating to his groin, pt with hx of back surgery a few years ago, spinal work up negative, pt is s/p heart cath with R groin access 9 days ago, access site looks good. Pt states that the pain is not constant and he is feeling a little better, he lives with his wife, 3 steps to enter with a handrail, level once inside and has a walker if needed, was not using one previously. Pt was able to walk 150 ft with rwx with stand by assist, and he is safe to return home, MD [facility] PT, so pt can be referred to outpt PT if needed

## 2023-04-09 NOTE — PLAN OF CARE
Goal Outcome Evaluation: pt is alert and oriented. Pt is going to discharge with physical therapy orders. Pt has been ambulating in [place]. Vss. Pt states understanding of follow up appointments and instructions, and medications. Taking all belongings and leaving via family car w spouse.

## 2023-04-10 ENCOUNTER — READMISSION MANAGEMENT (OUTPATIENT)
Dept: CALL CENTER | Facility: HOSPITAL | Age: 68
End: 2023-04-10
Payer: MEDICARE

## 2023-04-10 NOTE — OUTREACH NOTE
Prep Survey    Flowsheet Row Responses   [facility] facility patient discharged from? [place]   Is LACE score < 7 ? No   Eligibility Readm Mgmt   Discharge diagnosis Right low back pain   Does the patient have one of the following disease processes/diagnoses(primary or secondary)? Other   Does the patient have Home health ordered? No   Is there a DME ordered? No   Prep survey completed? Yes          Jeanette KAPLAN - Registered Nurse

## 2023-04-12 ENCOUNTER — READMISSION MANAGEMENT (OUTPATIENT)
Dept: CALL CENTER | Facility: HOSPITAL | Age: 68
End: 2023-04-12
Payer: MEDICARE

## 2023-04-12 NOTE — OUTREACH NOTE
Medical Week 1 Survey    Flowsheet Row Responses   [facility] patient discharged from? [place]   Does the patient have one of the following disease processes/diagnoses(primary or secondary)? Other   Week 1 attempt successful? No   Unsuccessful attempts Attempt 1  [All numbers listed were attempted-no answer]          Celestina H - Registered Nurse

## 2023-04-18 ENCOUNTER — READMISSION MANAGEMENT (OUTPATIENT)
Dept: CALL CENTER | Facility: HOSPITAL | Age: 68
End: 2023-04-18
Payer: MEDICARE

## 2023-04-18 NOTE — OUTREACH NOTE
Medical Week 2 Survey    Flowsheet Row Responses   [facility] patient discharged from? [place]   Does the patient have one of the following disease processes/diagnoses(primary or secondary)? Other   Week 2 attempt successful? No   Unsuccessful attempts Attempt 1          Michelle [place] - Licensed Nurse

## 2023-04-20 ENCOUNTER — READMISSION MANAGEMENT (OUTPATIENT)
Dept: CALL CENTER | Facility: HOSPITAL | Age: 68
End: 2023-04-20
Payer: MEDICARE

## 2023-04-20 NOTE — OUTREACH NOTE
Medical Week 2 Survey    Flowsheet Row Responses   [facility] [place] patient discharged from? [place]   Does the patient have one of the following disease processes/diagnoses(primary or secondary)? Other   Week 2 attempt successful? Yes   Call start time 1523   Discharge diagnosis Right low back pain   Call end time 1529   Person spoke with today (if not patient) and relationship spouse   Meds reviewed with patient/caregiver? Yes   Is the patient having any side effects they believe may be caused by any medication additions or changes? No   Does the patient have all medications ordered at discharge? Yes   Is the patient taking all medications as directed (includes completed medication regime)? Yes   Does the patient have a primary care provider?  Yes   Comments regarding PCP Has been in contact with PCP   Has the patient kept scheduled appointments due by today? N/A   Has home health visited the patient within 72 hours of discharge? N/A   Home health comments out patient PT   DME comments uses walker prn   Psychosocial issues? No   Comments Pt uses his Incentive spirometer   Did the patient receive a copy of their discharge instructions? Yes   What is the patient's perception of their health status since discharge? Improving   Is the patient/caregiver able to teach back the hierarchy of who to call/visit for symptoms/problems? PCP, Specialist, Home health nurse, Urgent Care, ED, 911 Yes   Week 2 Call Completed? Yes   Graduated Yes   Is the patient interested in additional calls from an ambulatory ?  NOTE:  applies to high risk patients requiring additional follow-up. No   Graduated/Revoked comments spouse reports pt doing better, no concerns at this time          Kiesha KAPLAN - Registered Nurse